# Patient Record
Sex: FEMALE | Race: WHITE | NOT HISPANIC OR LATINO | Employment: PART TIME | URBAN - METROPOLITAN AREA
[De-identification: names, ages, dates, MRNs, and addresses within clinical notes are randomized per-mention and may not be internally consistent; named-entity substitution may affect disease eponyms.]

---

## 2017-07-14 ENCOUNTER — OFFICE VISIT (OUTPATIENT)
Dept: OBSTETRICS AND GYNECOLOGY | Facility: CLINIC | Age: 32
End: 2017-07-14

## 2017-07-14 ENCOUNTER — TELEPHONE (OUTPATIENT)
Dept: OBSTETRICS AND GYNECOLOGY | Facility: CLINIC | Age: 32
End: 2017-07-14

## 2017-07-14 VITALS
SYSTOLIC BLOOD PRESSURE: 110 MMHG | WEIGHT: 207 LBS | HEIGHT: 67 IN | BODY MASS INDEX: 32.49 KG/M2 | DIASTOLIC BLOOD PRESSURE: 62 MMHG

## 2017-07-14 DIAGNOSIS — Z13.9 SCREENING: ICD-10-CM

## 2017-07-14 DIAGNOSIS — Z31.69 ENCOUNTER FOR PRECONCEPTION CONSULTATION: Primary | ICD-10-CM

## 2017-07-14 LAB
BILIRUB BLD-MCNC: NEGATIVE MG/DL
CLARITY, POC: CLEAR
COLOR UR: YELLOW
GLUCOSE UR STRIP-MCNC: NEGATIVE MG/DL
KETONES UR QL: NEGATIVE
LEUKOCYTE EST, POC: NEGATIVE
NITRITE UR-MCNC: NEGATIVE MG/ML
PH UR: 7.5 [PH] (ref 5–8)
PROT UR STRIP-MCNC: NEGATIVE MG/DL
RBC # UR STRIP: NEGATIVE /UL
SP GR UR: 1.02 (ref 1–1.03)
UROBILINOGEN UR QL: NORMAL

## 2017-07-14 PROCEDURE — 99203 OFFICE O/P NEW LOW 30 MIN: CPT | Performed by: OBSTETRICS & GYNECOLOGY

## 2017-07-14 RX ORDER — DOCONEXENT, NIACINAMIDE, .ALPHA.-TOCOPHEROL ACETATE, DL-, CHOLECALCIFEROL, .BETA.-CAROTENE, ASCORBIC ACID, THIAMINE MONONITRATE, RIBOFLAVIN, PYRIDOXINE HYDROCHLORIDE, CYANOCOBALAMIN, IRON, ZINC OXIDE, CUPRIC OXIDE, POTASSIUM IODIDE, MAGNESIUM OXIDE, FOLIC ACID, AND LEVOMEFOLATE CALCIUM 200; 15; 20; 1000; 1100; 30; 1.6; 1.8; 2.5; 12; 29; 25; 2; 150; 20; .4; .6 MG/1; MG/1; [IU]/1; [IU]/1; [IU]/1; MG/1; MG/1; MG/1; MG/1; UG/1; MG/1; MG/1; MG/1; UG/1; MG/1; MG/1; MG/1
1 CAPSULE, LIQUID FILLED ORAL DAILY
Qty: 90 CAPSULE | Refills: 3 | Status: SHIPPED | OUTPATIENT
Start: 2017-07-14 | End: 2018-07-25 | Stop reason: SDUPTHER

## 2017-07-14 RX ORDER — PRENATAL VIT NO.126/IRON/FOLIC 28MG-0.8MG
TABLET ORAL DAILY
COMMUNITY
End: 2017-07-14 | Stop reason: CLARIF

## 2017-07-14 NOTE — PROGRESS NOTES
"Subjective   Matilda Cruz is a 31 y.o. female NEW PT/would like to get established/currently trying to get pregnant - had pap earlier this year - periods are regular - discussed folic acid, birthing plan - desires TOLAC - discussed this should be no problem since first csection was due to breech presentation -    History of Present Illness    The following portions of the patient's history were reviewed and updated as appropriate: allergies, current medications, past family history, past medical history, past social history, past surgical history and problem list.    Review of Systems   Constitutional: Negative for chills, fatigue and fever.   Gastrointestinal: Negative for abdominal distention and abdominal pain.   Genitourinary: Negative for dyspareunia, dysuria, menstrual problem, pelvic pain, vaginal bleeding, vaginal discharge and vaginal pain.   All other systems reviewed and are negative.  /62  Ht 66.5\" (168.9 cm)  Wt 207 lb (93.9 kg)  LMP 06/30/2017  Breastfeeding? No  BMI 32.91 kg/m2      Objective   Physical Exam   Constitutional: She is oriented to person, place, and time. She appears well-developed and well-nourished.   HENT:   Head: Normocephalic and atraumatic.   Pulmonary/Chest: Effort normal.   Neurological: She is alert and oriented to person, place, and time.   Skin: Skin is warm and dry.   Psychiatric: She has a normal mood and affect. Her behavior is normal.   Nursing note and vitals reviewed.        Assessment/Plan   Matilda was seen today for establish care and gynecologic exam.    Diagnoses and all orders for this visit:    Encounter for preconception consultation  -     Prenat-Fe Poly-Methfol-FA-DHA (VITAFOL ULTRA) 29-0.6-0.4-200 MG capsule; Take 1 tablet by mouth Daily.    Screening  -     POC Urinalysis Dipstick    Other orders  -     Cancel: IgP, Aptima HPV    return when pregnant or for annual in November   Counseling was given to patient and  for the following topics: " patient and family education and preconceptional counseling . Total time of the encounter was 20 minutes and 15 minutes was spend counseling.

## 2017-07-14 NOTE — TELEPHONE ENCOUNTER
Matilda called me to inform me that her previous delivery was by a different MD than the two she originally told me. There was a delay in receiving records initially requested for NP glenda 7/14 as patient didn't provide both of her prior last names and her most previous gyn office is closed permentaly; however records from [facility] & Dr. Woody have been obtained and routed to Dr. Laird for review. I have requested delivery records from Dr. Grey in [place] and will route to Dr. Laird for review as soon as they are received & scanned.

## 2017-12-06 ENCOUNTER — OFFICE VISIT (OUTPATIENT)
Dept: OBSTETRICS AND GYNECOLOGY | Facility: CLINIC | Age: 32
End: 2017-12-06

## 2017-12-06 VITALS
WEIGHT: 214.6 LBS | BODY MASS INDEX: 33.68 KG/M2 | SYSTOLIC BLOOD PRESSURE: 110 MMHG | HEIGHT: 67 IN | DIASTOLIC BLOOD PRESSURE: 68 MMHG

## 2017-12-06 DIAGNOSIS — Z13.89 SCREENING FOR BLOOD OR PROTEIN IN URINE: ICD-10-CM

## 2017-12-06 DIAGNOSIS — Z01.419 ENCOUNTER FOR GYNECOLOGICAL EXAMINATION WITHOUT ABNORMAL FINDING: Primary | ICD-10-CM

## 2017-12-06 DIAGNOSIS — Z12.4 ENCOUNTER FOR SCREENING FOR CERVICAL CANCER: ICD-10-CM

## 2017-12-06 LAB
BILIRUB BLD-MCNC: NEGATIVE MG/DL
CLARITY, POC: CLEAR
COLOR UR: YELLOW
GLUCOSE UR STRIP-MCNC: NEGATIVE MG/DL
KETONES UR QL: NEGATIVE
LEUKOCYTE EST, POC: NEGATIVE
NITRITE UR-MCNC: NEGATIVE MG/ML
PH UR: 7 [PH] (ref 5–8)
PROT UR STRIP-MCNC: NEGATIVE MG/DL
RBC # UR STRIP: ABNORMAL /UL
SP GR UR: 1.02 (ref 1–1.03)
UROBILINOGEN UR QL: NORMAL

## 2017-12-06 PROCEDURE — 99395 PREV VISIT EST AGE 18-39: CPT | Performed by: OBSTETRICS & GYNECOLOGY

## 2017-12-06 NOTE — PROGRESS NOTES
"Subjective   Matilda Cruz is a 32 y.o. female pt here for annual, last pap 11/22/16 negative - has been trying to get pregnant since June but periods have just regulated since removing IUD   History of Present Illness    The following portions of the patient's history were reviewed and updated as appropriate: allergies, current medications, past family history, past medical history, past social history, past surgical history and problem list.    Review of Systems   Constitutional: Negative for chills, fatigue and fever.   Gastrointestinal: Negative for abdominal distention and abdominal pain.   Genitourinary: Negative for dyspareunia, dysuria, menstrual problem, pelvic pain, vaginal bleeding, vaginal discharge and vaginal pain.   All other systems reviewed and are negative.  /68  Ht 170.2 cm (67\")  Wt 97.3 kg (214 lb 9.6 oz)  LMP 11/19/2017 (Exact Date)  BMI 33.61 kg/m2      Objective   Physical Exam   Constitutional: She is oriented to person, place, and time. She appears well-developed and well-nourished.   Neck: Normal range of motion. Neck supple. No thyromegaly present.   Cardiovascular: Normal rate and regular rhythm.    Pulmonary/Chest: Effort normal and breath sounds normal. Right breast exhibits no mass, no nipple discharge, no skin change and no tenderness. Left breast exhibits no mass, no nipple discharge, no skin change and no tenderness.   Abdominal: Soft. Bowel sounds are normal. She exhibits no distension. There is no tenderness.   Genitourinary: Vagina normal and uterus normal. Pelvic exam was performed with patient supine. Uterus is not tender. Cervix exhibits no friability. Right adnexum displays no mass and no tenderness. Left adnexum displays no mass and no tenderness. No vaginal discharge found.   Musculoskeletal: Normal range of motion. She exhibits no edema.   Neurological: She is alert and oriented to person, place, and time.   Skin: Skin is warm and dry. No rash noted. "   Psychiatric: She has a normal mood and affect. Her behavior is normal.   Nursing note and vitals reviewed.        Assessment/Plan   Matilda was seen today for gynecologic exam.    Diagnoses and all orders for this visit:    Encounter for gynecological examination without abnormal finding    Screening for blood or protein in urine  -     POC Urinalysis Dipstick    Encounter for screening for cervical cancer   -     IgP, Aptima HPV - ThinPrep Vial, Cervix        Counseling was given to patient and   for the following topics: nutrition and weight management  .   Return June if not pregnant

## 2018-02-23 ENCOUNTER — PROCEDURE VISIT (OUTPATIENT)
Dept: OBSTETRICS AND GYNECOLOGY | Age: 33
End: 2018-02-23

## 2018-02-23 ENCOUNTER — OFFICE VISIT (OUTPATIENT)
Dept: OBSTETRICS AND GYNECOLOGY | Age: 33
End: 2018-02-23

## 2018-02-23 VITALS
BODY MASS INDEX: 34.06 KG/M2 | DIASTOLIC BLOOD PRESSURE: 60 MMHG | WEIGHT: 217 LBS | HEIGHT: 67 IN | SYSTOLIC BLOOD PRESSURE: 110 MMHG

## 2018-02-23 DIAGNOSIS — Z32.00 ENCOUNTER FOR CONFIRMATION OF PREGNANCY TEST RESULT WITH PHYSICAL EXAMINATION: Primary | ICD-10-CM

## 2018-02-23 DIAGNOSIS — O99.211 OBESITY AFFECTING PREGNANCY IN FIRST TRIMESTER: ICD-10-CM

## 2018-02-23 DIAGNOSIS — O36.80X0 ENCOUNTER TO DETERMINE FETAL VIABILITY OF PREGNANCY, SINGLE OR UNSPECIFIED FETUS: Primary | ICD-10-CM

## 2018-02-23 DIAGNOSIS — O34.219 PREVIOUS CESAREAN DELIVERY AFFECTING PREGNANCY: ICD-10-CM

## 2018-02-23 DIAGNOSIS — Z13.89 SCREENING FOR BLOOD OR PROTEIN IN URINE: ICD-10-CM

## 2018-02-23 PROBLEM — O99.210 OBESITY AFFECTING PREGNANCY: Status: ACTIVE | Noted: 2018-02-23

## 2018-02-23 LAB
BILIRUB BLD-MCNC: NEGATIVE MG/DL
CLARITY, POC: CLEAR
COLOR UR: YELLOW
GLUCOSE UR STRIP-MCNC: NEGATIVE MG/DL
KETONES UR QL: NEGATIVE
LEUKOCYTE EST, POC: NEGATIVE
NITRITE UR-MCNC: NEGATIVE MG/ML
PH UR: 6.5 [PH] (ref 5–8)
PROT UR STRIP-MCNC: NEGATIVE MG/DL
RBC # UR STRIP: ABNORMAL /UL
SP GR UR: 1.01 (ref 1–1.03)
UROBILINOGEN UR QL: NORMAL

## 2018-02-23 PROCEDURE — 99214 OFFICE O/P EST MOD 30 MIN: CPT | Performed by: OBSTETRICS & GYNECOLOGY

## 2018-02-23 PROCEDURE — 76817 TRANSVAGINAL US OBSTETRIC: CPT | Performed by: OBSTETRICS & GYNECOLOGY

## 2018-02-23 NOTE — PROGRESS NOTES
"Subjective   Matilda Cruz is a 32 y.o. female who presents for +hpt, Lmp 17 - LMP rejected - 10-2 weeks today by US -RADHA 18   History of Present Illness    The following portions of the patient's history were reviewed and updated as appropriate: allergies, current medications, past family history, past medical history, past social history, past surgical history and problem list.    Review of Systems   Constitutional: Negative for chills, fatigue and fever.   Gastrointestinal: Negative for abdominal distention and abdominal pain.   Genitourinary: Negative for dyspareunia, dysuria, menstrual problem, pelvic pain, vaginal bleeding, vaginal discharge and vaginal pain.   All other systems reviewed and are negative.  /60  Ht 170.2 cm (67\")  Wt 98.4 kg (217 lb)  LMP 2017  Breastfeeding? No  BMI 33.99 kg/m2      Objective   Physical Exam   Constitutional: She is oriented to person, place, and time. She appears well-developed and well-nourished.   Pulmonary/Chest: Effort normal.   Neurological: She is alert and oriented to person, place, and time.   Skin: Skin is warm and dry.   Psychiatric: She has a normal mood and affect. Her behavior is normal.   Nursing note and vitals reviewed.        Assessment/Plan   Matilda was seen today for follow-up.    Diagnoses and all orders for this visit:    Encounter for confirmation of pregnancy test result with physical examination  -     Varicella Zoster Antibody, IgG  -     Urine Culture - Urine, Urine, Clean Catch  -     Rubella Antibody, IgG  -     RPR  -     HIV-1 / O / 2 Ag / Antibody 4th Generation  -     Hepatitis C Antibody  -     Hepatitis B Surface Antigen  -     Hgb. Frac. Profile  -     CBC & Differential  -     Antibody Screen  -     ABO / Rh  -     Chlamydia trachomatis, Neisseria gonorrhoeae, Trichomonas vaginalis, PCR - Swab, Urine, Clean Catch    Screening for blood or protein in urine  -     POC Urinalysis Dipstick    Previous  " delivery affecting pregnancy    Obesity affecting pregnancy in first trimester

## 2018-02-26 LAB
ABO GROUP BLD: NORMAL
BACTERIA UR CULT: NO GROWTH
BACTERIA UR CULT: NORMAL
BASOPHILS # BLD AUTO: 0.02 10*3/MM3 (ref 0–0.2)
BASOPHILS NFR BLD AUTO: 0.2 % (ref 0–1.5)
BLD GP AB SCN SERPL QL: NEGATIVE
C TRACH RRNA SPEC QL NAA+PROBE: NEGATIVE
EOSINOPHIL # BLD AUTO: 0.1 10*3/MM3 (ref 0–0.7)
EOSINOPHIL NFR BLD AUTO: 1.1 % (ref 0.3–6.2)
ERYTHROCYTE [DISTWIDTH] IN BLOOD BY AUTOMATED COUNT: 13.3 % (ref 11.7–13)
HBV SURFACE AG SERPL QL IA: NEGATIVE
HCT VFR BLD AUTO: 40.3 % (ref 35.6–45.5)
HCV AB S/CO SERPL IA: <0.1 S/CO RATIO (ref 0–0.9)
HGB A MFR BLD: 97.9 % (ref 96.4–98.8)
HGB A2 MFR BLD COLUMN CHROM: 2.1 % (ref 1.8–3.2)
HGB BLD-MCNC: 13.3 G/DL (ref 11.9–15.5)
HGB C MFR BLD: 0 %
HGB F MFR BLD: 0 % (ref 0–2)
HGB FRACT BLD-IMP: NORMAL
HGB S BLD QL SOLY: NEGATIVE
HGB S MFR BLD: 0 %
HIV 1+2 AB+HIV1 P24 AG SERPL QL IA: NON REACTIVE
IMM GRANULOCYTES # BLD: 0.02 10*3/MM3 (ref 0–0.03)
IMM GRANULOCYTES NFR BLD: 0.2 % (ref 0–0.5)
LYMPHOCYTES # BLD AUTO: 1.99 10*3/MM3 (ref 0.9–4.8)
LYMPHOCYTES NFR BLD AUTO: 22.7 % (ref 19.6–45.3)
MCH RBC QN AUTO: 30.2 PG (ref 26.9–32)
MCHC RBC AUTO-ENTMCNC: 33 G/DL (ref 32.4–36.3)
MCV RBC AUTO: 91.6 FL (ref 80.5–98.2)
MONOCYTES # BLD AUTO: 0.63 10*3/MM3 (ref 0.2–1.2)
MONOCYTES NFR BLD AUTO: 7.2 % (ref 5–12)
N GONORRHOEA RRNA SPEC QL NAA+PROBE: NEGATIVE
NEUTROPHILS # BLD AUTO: 6.02 10*3/MM3 (ref 1.9–8.1)
NEUTROPHILS NFR BLD AUTO: 68.6 % (ref 42.7–76)
PLATELET # BLD AUTO: 267 10*3/MM3 (ref 140–500)
RBC # BLD AUTO: 4.4 10*6/MM3 (ref 3.9–5.2)
RH BLD: POSITIVE
RPR SER QL: NORMAL
RUBV IGG SERPL IA-ACNC: 4.18 INDEX
T VAGINALIS RRNA SPEC QL NAA+PROBE: NEGATIVE
VZV IGG SER IA-ACNC: 826 INDEX
WBC # BLD AUTO: 8.78 10*3/MM3 (ref 4.5–10.7)

## 2018-03-06 ENCOUNTER — TELEPHONE (OUTPATIENT)
Dept: OBSTETRICS AND GYNECOLOGY | Age: 33
End: 2018-03-06

## 2018-03-07 ENCOUNTER — TELEPHONE (OUTPATIENT)
Dept: OBSTETRICS AND GYNECOLOGY | Age: 33
End: 2018-03-07

## 2018-03-07 NOTE — TELEPHONE ENCOUNTER
Spoke with Pt she and told her her labs are not back yet and we would call her as soon as possible

## 2018-03-09 ENCOUNTER — TELEPHONE (OUTPATIENT)
Dept: OBSTETRICS AND GYNECOLOGY | Age: 33
End: 2018-03-09

## 2018-03-09 NOTE — TELEPHONE ENCOUNTER
I spoke with Matilda and she was informed that we need to repeat Nex Gen because of not enough DNA .She has apt this coming Friday and will have it re drawn then . She was concerned about being charged twice and I spoke with cs at [facility] and they says no charge for the redraw

## 2018-03-09 NOTE — TELEPHONE ENCOUNTER
Kiarra - please call patient and see when she can come back in for nexgen - there was not enough DNA in original draw.

## 2018-03-09 NOTE — TELEPHONE ENCOUNTER
[facility] called to let us know there was not enough DNA for the testing and pt is needing to come and do a redraw.

## 2018-03-16 ENCOUNTER — INITIAL PRENATAL (OUTPATIENT)
Dept: OBSTETRICS AND GYNECOLOGY | Age: 33
End: 2018-03-16

## 2018-03-16 VITALS — WEIGHT: 220 LBS | BODY MASS INDEX: 34.46 KG/M2 | SYSTOLIC BLOOD PRESSURE: 118 MMHG | DIASTOLIC BLOOD PRESSURE: 62 MMHG

## 2018-03-16 DIAGNOSIS — O34.219 PREVIOUS CESAREAN DELIVERY AFFECTING PREGNANCY: ICD-10-CM

## 2018-03-16 DIAGNOSIS — Z13.89 SCREENING FOR BLOOD OR PROTEIN IN URINE: ICD-10-CM

## 2018-03-16 DIAGNOSIS — O34.219 DESIRES VBAC (VAGINAL BIRTH AFTER CESAREAN) TRIAL: ICD-10-CM

## 2018-03-16 DIAGNOSIS — O99.211 OBESITY AFFECTING PREGNANCY IN FIRST TRIMESTER: ICD-10-CM

## 2018-03-16 DIAGNOSIS — Z34.81 PRENATAL CARE, SUBSEQUENT PREGNANCY IN FIRST TRIMESTER: Primary | ICD-10-CM

## 2018-03-16 LAB
BILIRUB BLD-MCNC: NEGATIVE MG/DL
CLARITY, POC: CLEAR
COLOR UR: YELLOW
GLUCOSE UR STRIP-MCNC: NEGATIVE MG/DL
KETONES UR QL: NEGATIVE
LEUKOCYTE EST, POC: NEGATIVE
NITRITE UR-MCNC: NEGATIVE MG/ML
PH UR: 6.5 [PH] (ref 5–8)
PROT UR STRIP-MCNC: NEGATIVE MG/DL
RBC # UR STRIP: ABNORMAL /UL
SP GR UR: 1.02 (ref 1–1.03)
UROBILINOGEN UR QL: NORMAL

## 2018-03-16 PROCEDURE — 99213 OFFICE O/P EST LOW 20 MIN: CPT | Performed by: OBSTETRICS & GYNECOLOGY

## 2018-03-16 NOTE — PROGRESS NOTES
Cc/ no c/o/sc    HPI: 32 y.o.  at 13w2d presents for ob intake - no complaints     Vitals:    18 1149   BP: 118/62   Weight: 99.8 kg (220 lb)         ROS:  GI:  Negative  : neg  Pulmonary: Negative     A/P  1. Intrauterine pregnancy at 13w2d   2. Pregnancy Risk:  HIGH RISK    Matilda was seen today for routine prenatal visit.    Diagnoses and all orders for this visit:    Prenatal care, subsequent pregnancy in first trimester    Screening for blood or protein in urine  -     POC Urinalysis Dipstick    Obesity affecting pregnancy in first trimester    Previous  delivery affecting pregnancy    Desires  (vaginal birth after ) trial        -----------------------    PLAN:   Return in about 4 weeks (around 2018), or ob check .  OB intake done   Prenatal labs reviewed   Will redo Nexgen   SAB warnings   4 weeks   Considering  - will continue to discuss  Obesity - discussed weight gain/nutrition       Lorie Laird MD  3/16/2018 12:50 PM

## 2018-03-23 ENCOUNTER — TELEPHONE (OUTPATIENT)
Dept: OBSTETRICS AND GYNECOLOGY | Age: 33
End: 2018-03-23

## 2018-04-11 ENCOUNTER — ROUTINE PRENATAL (OUTPATIENT)
Dept: OBSTETRICS AND GYNECOLOGY | Age: 33
End: 2018-04-11

## 2018-04-11 VITALS — SYSTOLIC BLOOD PRESSURE: 106 MMHG | BODY MASS INDEX: 34.77 KG/M2 | WEIGHT: 222 LBS | DIASTOLIC BLOOD PRESSURE: 70 MMHG

## 2018-04-11 DIAGNOSIS — O99.212 OBESITY AFFECTING PREGNANCY IN SECOND TRIMESTER: ICD-10-CM

## 2018-04-11 DIAGNOSIS — Z34.82 PRENATAL CARE, SUBSEQUENT PREGNANCY IN SECOND TRIMESTER: Primary | ICD-10-CM

## 2018-04-11 DIAGNOSIS — O34.219 PREVIOUS CESAREAN DELIVERY AFFECTING PREGNANCY: ICD-10-CM

## 2018-04-11 DIAGNOSIS — Z3A.17 17 WEEKS GESTATION OF PREGNANCY: ICD-10-CM

## 2018-04-11 DIAGNOSIS — O34.219 DESIRES VBAC (VAGINAL BIRTH AFTER CESAREAN) TRIAL: ICD-10-CM

## 2018-04-11 LAB
BILIRUB BLD-MCNC: NEGATIVE MG/DL
GLUCOSE UR STRIP-MCNC: NEGATIVE MG/DL
KETONES UR QL: NEGATIVE
LEUKOCYTE EST, POC: NEGATIVE
NITRITE UR-MCNC: NEGATIVE MG/ML
PH UR: 5.5 [PH] (ref 5–8)
PROT UR STRIP-MCNC: NEGATIVE MG/DL
RBC # UR STRIP: ABNORMAL /UL
SP GR UR: 1.02 (ref 1–1.03)
UROBILINOGEN UR QL: NORMAL

## 2018-04-11 PROCEDURE — 99213 OFFICE O/P EST LOW 20 MIN: CPT | Performed by: OBSTETRICS & GYNECOLOGY

## 2018-04-11 NOTE — PROGRESS NOTES
Chief Complaint   Patient presents with   • Routine Prenatal Visit       HPI: 32 y.o.  at 17w0d presents for prenatal care - denies complaints     Vitals:    18 1518   BP: 106/70   Weight: 101 kg (222 lb)       ROS:  GI:  Negative  : neg  Pulmonary: Negative     A/P  1. Intrauterine pregnancy at 17w0d   2. Pregnancy Risk:  HIGH RISK    Matilda was seen today for routine prenatal visit.    Diagnoses and all orders for this visit:    17 weeks gestation of pregnancy  -     POC Urinalysis Dipstick    Previous  delivery affecting pregnancy    Desires  (vaginal birth after ) trial    Obesity affecting pregnancy in second trimester        -----------------------  PLAN:   Return in about 4 weeks (around 2018), or ob check with me .  SAB warnings   Shirley US 2 weeks   Prior  for breech - desires TOLAC   Declines AFP   Lorie Laird MD  2018 3:39 PM

## 2018-04-27 ENCOUNTER — PROCEDURE VISIT (OUTPATIENT)
Dept: OBSTETRICS AND GYNECOLOGY | Age: 33
End: 2018-04-27

## 2018-04-27 ENCOUNTER — ROUTINE PRENATAL (OUTPATIENT)
Dept: OBSTETRICS AND GYNECOLOGY | Age: 33
End: 2018-04-27

## 2018-04-27 VITALS — BODY MASS INDEX: 34.93 KG/M2 | WEIGHT: 223 LBS | DIASTOLIC BLOOD PRESSURE: 72 MMHG | SYSTOLIC BLOOD PRESSURE: 112 MMHG

## 2018-04-27 DIAGNOSIS — Z34.92 SECOND TRIMESTER FETUS: Primary | ICD-10-CM

## 2018-04-27 DIAGNOSIS — IMO0002 EVALUATE ANATOMY NOT SEEN ON PRIOR SONOGRAM: ICD-10-CM

## 2018-04-27 DIAGNOSIS — O34.219 PREVIOUS CESAREAN DELIVERY AFFECTING PREGNANCY: ICD-10-CM

## 2018-04-27 DIAGNOSIS — O34.219 DESIRES VBAC (VAGINAL BIRTH AFTER CESAREAN) TRIAL: ICD-10-CM

## 2018-04-27 DIAGNOSIS — Z3A.19 19 WEEKS GESTATION OF PREGNANCY: ICD-10-CM

## 2018-04-27 DIAGNOSIS — Z34.82 PRENATAL CARE, SUBSEQUENT PREGNANCY IN SECOND TRIMESTER: Primary | ICD-10-CM

## 2018-04-27 DIAGNOSIS — O99.212 OBESITY AFFECTING PREGNANCY IN SECOND TRIMESTER: ICD-10-CM

## 2018-04-27 LAB
BILIRUB BLD-MCNC: NEGATIVE MG/DL
GLUCOSE UR STRIP-MCNC: NEGATIVE MG/DL
KETONES UR QL: ABNORMAL
LEUKOCYTE EST, POC: NEGATIVE
NITRITE UR-MCNC: NEGATIVE MG/ML
PH UR: 6.5 [PH] (ref 5–8)
PROT UR STRIP-MCNC: NEGATIVE MG/DL
RBC # UR STRIP: ABNORMAL /UL
SP GR UR: 1.01 (ref 1–1.03)
UROBILINOGEN UR QL: NORMAL

## 2018-04-27 PROCEDURE — 99213 OFFICE O/P EST LOW 20 MIN: CPT | Performed by: OBSTETRICS & GYNECOLOGY

## 2018-04-27 PROCEDURE — 76805 OB US >/= 14 WKS SNGL FETUS: CPT | Performed by: OBSTETRICS & GYNECOLOGY

## 2018-05-11 ENCOUNTER — PROCEDURE VISIT (OUTPATIENT)
Dept: OBSTETRICS AND GYNECOLOGY | Age: 33
End: 2018-05-11

## 2018-05-11 ENCOUNTER — ROUTINE PRENATAL (OUTPATIENT)
Dept: OBSTETRICS AND GYNECOLOGY | Age: 33
End: 2018-05-11

## 2018-05-11 VITALS — SYSTOLIC BLOOD PRESSURE: 120 MMHG | WEIGHT: 223 LBS | BODY MASS INDEX: 34.93 KG/M2 | DIASTOLIC BLOOD PRESSURE: 70 MMHG

## 2018-05-11 DIAGNOSIS — Z34.82 PRENATAL CARE, SUBSEQUENT PREGNANCY IN SECOND TRIMESTER: Primary | ICD-10-CM

## 2018-05-11 DIAGNOSIS — O34.219 DESIRES VBAC (VAGINAL BIRTH AFTER CESAREAN) TRIAL: ICD-10-CM

## 2018-05-11 DIAGNOSIS — IMO0002 EVALUATE ANATOMY NOT SEEN ON PRIOR SONOGRAM: Primary | ICD-10-CM

## 2018-05-11 DIAGNOSIS — O99.212 OBESITY AFFECTING PREGNANCY IN SECOND TRIMESTER: ICD-10-CM

## 2018-05-11 DIAGNOSIS — IMO0002 EVALUATE ANATOMY NOT SEEN ON PRIOR SONOGRAM: ICD-10-CM

## 2018-05-11 DIAGNOSIS — Z13.89 SCREENING FOR BLOOD OR PROTEIN IN URINE: ICD-10-CM

## 2018-05-11 DIAGNOSIS — O34.219 PREVIOUS CESAREAN DELIVERY AFFECTING PREGNANCY: ICD-10-CM

## 2018-05-11 LAB
BILIRUB BLD-MCNC: NEGATIVE MG/DL
GLUCOSE UR STRIP-MCNC: NEGATIVE MG/DL
KETONES UR QL: NEGATIVE
LEUKOCYTE EST, POC: NEGATIVE
NITRITE UR-MCNC: NEGATIVE MG/ML
PH UR: 6 [PH] (ref 5–8)
PROT UR STRIP-MCNC: NEGATIVE MG/DL
RBC # UR STRIP: NEGATIVE /UL
SP GR UR: 1.01 (ref 1–1.03)
UROBILINOGEN UR QL: NORMAL

## 2018-05-11 PROCEDURE — 99213 OFFICE O/P EST LOW 20 MIN: CPT | Performed by: OBSTETRICS & GYNECOLOGY

## 2018-05-11 PROCEDURE — 76815 OB US LIMITED FETUS(S): CPT | Performed by: OBSTETRICS & GYNECOLOGY

## 2018-05-11 NOTE — PROGRESS NOTES
Chief Complaint   Patient presents with   • Routine Prenatal Visit     cc:  No c/o.rlr       HPI: 32 y.o.  at 21w2d presents for prenatal care - denies complaints - normal completed anatomy today        Vitals:    18 0934   BP: 120/70   Weight: 101 kg (223 lb)       ROS:  GI:  Negative  : neg  Pulmonary: Negative     A/P  1. Intrauterine pregnancy at 21w2d   2. Pregnancy Risk:  HIGH RISK    Matilda was seen today for routine prenatal visit.    Diagnoses and all orders for this visit:    Screening for blood or protein in urine  -     POC Urinalysis Dipstick    Desires  (vaginal birth after ) trial    Obesity affecting pregnancy in second trimester    Evaluate anatomy not seen on prior sonogram    Previous  delivery affecting pregnancy        -----------------------  PLAN:   Return in about 3 weeks (around 2018), or ob check with me .  Normal completed anatomy today   Obesity - doing well  Prior csection - desires TOLAC   SAB warnings     Lorie Laird MD  2018 11:01 AM

## 2018-05-30 ENCOUNTER — ROUTINE PRENATAL (OUTPATIENT)
Dept: OBSTETRICS AND GYNECOLOGY | Age: 33
End: 2018-05-30

## 2018-05-30 VITALS — SYSTOLIC BLOOD PRESSURE: 102 MMHG | WEIGHT: 230 LBS | DIASTOLIC BLOOD PRESSURE: 70 MMHG | BODY MASS INDEX: 36.02 KG/M2

## 2018-05-30 DIAGNOSIS — Z34.82 PRENATAL CARE, SUBSEQUENT PREGNANCY IN SECOND TRIMESTER: Primary | ICD-10-CM

## 2018-05-30 DIAGNOSIS — O34.219 PREVIOUS CESAREAN DELIVERY AFFECTING PREGNANCY: ICD-10-CM

## 2018-05-30 DIAGNOSIS — Z3A.23 23 WEEKS GESTATION OF PREGNANCY: ICD-10-CM

## 2018-05-30 DIAGNOSIS — O34.219 DESIRES VBAC (VAGINAL BIRTH AFTER CESAREAN) TRIAL: ICD-10-CM

## 2018-05-30 DIAGNOSIS — O99.212 OBESITY AFFECTING PREGNANCY IN SECOND TRIMESTER: ICD-10-CM

## 2018-05-30 LAB
BILIRUB BLD-MCNC: NEGATIVE MG/DL
CLARITY, POC: CLEAR
COLOR UR: YELLOW
GLUCOSE UR STRIP-MCNC: NEGATIVE MG/DL
KETONES UR QL: NEGATIVE
LEUKOCYTE EST, POC: NEGATIVE
NITRITE UR-MCNC: NEGATIVE MG/ML
PH UR: 6 [PH] (ref 5–8)
PROT UR STRIP-MCNC: NEGATIVE MG/DL
RBC # UR STRIP: ABNORMAL /UL
SP GR UR: 1 (ref 1–1.03)
UROBILINOGEN UR QL: NORMAL

## 2018-05-30 PROCEDURE — 99213 OFFICE O/P EST LOW 20 MIN: CPT | Performed by: OBSTETRICS & GYNECOLOGY

## 2018-05-30 NOTE — PROGRESS NOTES
Chief Complaint   Patient presents with   • Routine Prenatal Visit     Matilda has no complaints        HPI: 32 y.o.  at 24w0d presents for prenatal care - denies loss of fluid, vag bleeding or ctx +fM     Vitals:    18 1405   BP: 102/70   Weight: 104 kg (230 lb)       ROS:  GI:  Negative  : neg  Pulmonary: Negative     A/P  1. Intrauterine pregnancy at 24w0d   2. Pregnancy Risk:  HIGH RISK    Matilda was seen today for routine prenatal visit.    Diagnoses and all orders for this visit:    23 weeks gestation of pregnancy  -     POC Urinalysis Dipstick    Obesity affecting pregnancy in second trimester    Desires  (vaginal birth after ) trial    Previous  delivery affecting pregnancy        -----------------------  PLAN:   Return in about 4 weeks (around 2018), or ob check with one-hour gtt and growth uS .  Obesity - 13 pound weight gain   Check growth next visit   Prior  - desires TOLAC   PTL warnings     Lorie Laird MD  2018 5:25 PM

## 2018-06-05 ENCOUNTER — TELEPHONE (OUTPATIENT)
Dept: OBSTETRICS AND GYNECOLOGY | Age: 33
End: 2018-06-05

## 2018-06-05 NOTE — TELEPHONE ENCOUNTER
PRIYA.    Pt came in last week stating she felt she had a cold. Pt states she thinks its just getting worse and worse. Informed pt she should seek making a appointment with her family Dr or urgent care. Pt states she has also lost about 6 pounds in a week as well. Did not mention any throwing up or diarrhea. Did mention being checked out at family Dr for flu or something else. Pt will call back if she has any questions about what they prescribe.

## 2018-06-27 ENCOUNTER — PROCEDURE VISIT (OUTPATIENT)
Dept: OBSTETRICS AND GYNECOLOGY | Age: 33
End: 2018-06-27

## 2018-06-27 ENCOUNTER — ROUTINE PRENATAL (OUTPATIENT)
Dept: OBSTETRICS AND GYNECOLOGY | Age: 33
End: 2018-06-27

## 2018-06-27 VITALS — SYSTOLIC BLOOD PRESSURE: 128 MMHG | WEIGHT: 230 LBS | DIASTOLIC BLOOD PRESSURE: 80 MMHG | BODY MASS INDEX: 36.02 KG/M2

## 2018-06-27 DIAGNOSIS — O34.219 PREVIOUS CESAREAN DELIVERY AFFECTING PREGNANCY: ICD-10-CM

## 2018-06-27 DIAGNOSIS — Z3A.28 28 WEEKS GESTATION OF PREGNANCY: ICD-10-CM

## 2018-06-27 DIAGNOSIS — Z13.0 SCREENING FOR IRON DEFICIENCY ANEMIA: ICD-10-CM

## 2018-06-27 DIAGNOSIS — O34.219 DESIRES VBAC (VAGINAL BIRTH AFTER CESAREAN) TRIAL: ICD-10-CM

## 2018-06-27 DIAGNOSIS — Z34.83 PRENATAL CARE, SUBSEQUENT PREGNANCY IN THIRD TRIMESTER: Primary | ICD-10-CM

## 2018-06-27 DIAGNOSIS — Z23 NEED FOR TDAP VACCINATION: ICD-10-CM

## 2018-06-27 DIAGNOSIS — Z36.89 ULTRASOUND SCAN TO CHECK INTERVAL GROWTH OF FETUS: Primary | ICD-10-CM

## 2018-06-27 DIAGNOSIS — O99.213 OBESITY AFFECTING PREGNANCY IN THIRD TRIMESTER: ICD-10-CM

## 2018-06-27 DIAGNOSIS — Z13.89 SCREENING FOR BLOOD OR PROTEIN IN URINE: ICD-10-CM

## 2018-06-27 DIAGNOSIS — Z13.1 SCREENING FOR DIABETES MELLITUS: ICD-10-CM

## 2018-06-27 LAB
BILIRUB BLD-MCNC: NEGATIVE MG/DL
GLUCOSE 1H P 50 G GLC PO SERPL-MCNC: 122 MG/DL (ref 65–139)
GLUCOSE UR STRIP-MCNC: ABNORMAL MG/DL
HCT VFR BLD AUTO: 36.2 % (ref 35.6–45.5)
HGB BLD-MCNC: 11.3 G/DL (ref 11.9–15.5)
KETONES UR QL: ABNORMAL
LEUKOCYTE EST, POC: NEGATIVE
NITRITE UR-MCNC: NEGATIVE MG/ML
PH UR: 6.5 [PH] (ref 5–8)
PROT UR STRIP-MCNC: NEGATIVE MG/DL
RBC # UR STRIP: ABNORMAL /UL
SP GR UR: 1.01 (ref 1–1.03)
UROBILINOGEN UR QL: NORMAL

## 2018-06-27 PROCEDURE — 90715 TDAP VACCINE 7 YRS/> IM: CPT | Performed by: OBSTETRICS & GYNECOLOGY

## 2018-06-27 PROCEDURE — 90471 IMMUNIZATION ADMIN: CPT | Performed by: OBSTETRICS & GYNECOLOGY

## 2018-06-27 PROCEDURE — 99213 OFFICE O/P EST LOW 20 MIN: CPT | Performed by: OBSTETRICS & GYNECOLOGY

## 2018-06-27 NOTE — PROGRESS NOTES
Chief Complaint   Patient presents with   • Routine Prenatal Visit      1 hr gtt , tdap , h-h        HPI: 32 y.o.  at 28w0d presents for prenatal care - denies loss of fluid, vag bleeding, ctx +FM     Vitals:    18 1511   BP: 128/80   Weight: 104 kg (230 lb)       ROS:  GI:  Negative  : neg  Pulmonary: Negative     A/P  1. Intrauterine pregnancy at 28w0d   2. Pregnancy Risk:  HIGH RISK    Matilda was seen today for routine prenatal visit.    Diagnoses and all orders for this visit:    Prenatal care, subsequent pregnancy in third trimester    Screening for blood or protein in urine  -     POC Urinalysis Dipstick    Screening for diabetes mellitus  -     Gestational Screen 1 Hr ([facility])    Screening for iron deficiency anemia  -     Hemoglobin & Hematocrit, Blood    Obesity affecting pregnancy in third trimester    Desires  (vaginal birth after ) trial    Previous  delivery affecting pregnancy    Need for Tdap vaccination    Other orders  -     Tdap Vaccine Greater Than or Equal To 8yo IM        -----------------------  PLAN:   Return in about 2 weeks (around 2018), or ob check and growth US .  Tdap today   One- hour gtt today   Growth next visit   Obesity - 13 pound weight gain     Lorie Laird MD  2018 3:42 PM

## 2018-06-28 ENCOUNTER — TELEPHONE (OUTPATIENT)
Dept: OBSTETRICS AND GYNECOLOGY | Age: 33
End: 2018-06-28

## 2018-06-28 NOTE — TELEPHONE ENCOUNTER
----- Message from Lorie Laird MD sent at 6/28/2018  7:43 AM EDT -----  Please call patient and notify of normal results of one-hour gtt

## 2018-07-09 ENCOUNTER — PROCEDURE VISIT (OUTPATIENT)
Dept: OBSTETRICS AND GYNECOLOGY | Age: 33
End: 2018-07-09

## 2018-07-09 ENCOUNTER — ROUTINE PRENATAL (OUTPATIENT)
Dept: OBSTETRICS AND GYNECOLOGY | Age: 33
End: 2018-07-09

## 2018-07-09 VITALS — DIASTOLIC BLOOD PRESSURE: 75 MMHG | WEIGHT: 230 LBS | SYSTOLIC BLOOD PRESSURE: 100 MMHG | BODY MASS INDEX: 36.02 KG/M2

## 2018-07-09 DIAGNOSIS — O34.219 PREVIOUS CESAREAN DELIVERY AFFECTING PREGNANCY: ICD-10-CM

## 2018-07-09 DIAGNOSIS — O26.843 UTERINE SIZE-DATE DISCREPANCY IN THIRD TRIMESTER: ICD-10-CM

## 2018-07-09 DIAGNOSIS — Z36.89 ULTRASOUND SCAN TO CHECK INTERVAL GROWTH OF FETUS: ICD-10-CM

## 2018-07-09 DIAGNOSIS — Z13.89 SCREENING FOR BLOOD OR PROTEIN IN URINE: ICD-10-CM

## 2018-07-09 DIAGNOSIS — O34.219 DESIRES VBAC (VAGINAL BIRTH AFTER CESAREAN) TRIAL: ICD-10-CM

## 2018-07-09 DIAGNOSIS — O99.213 OBESITY AFFECTING PREGNANCY IN THIRD TRIMESTER: Primary | ICD-10-CM

## 2018-07-09 DIAGNOSIS — Z34.83 PRENATAL CARE, SUBSEQUENT PREGNANCY IN THIRD TRIMESTER: Primary | ICD-10-CM

## 2018-07-09 DIAGNOSIS — O99.213 OBESITY AFFECTING PREGNANCY IN THIRD TRIMESTER: ICD-10-CM

## 2018-07-09 LAB
BILIRUB BLD-MCNC: NEGATIVE MG/DL
GLUCOSE UR STRIP-MCNC: NEGATIVE MG/DL
KETONES UR QL: NEGATIVE
LEUKOCYTE EST, POC: ABNORMAL
NITRITE UR-MCNC: NEGATIVE MG/ML
PH UR: 7 [PH] (ref 5–8)
PROT UR STRIP-MCNC: NEGATIVE MG/DL
RBC # UR STRIP: NEGATIVE /UL
SP GR UR: 1.01 (ref 1–1.03)
UROBILINOGEN UR QL: NORMAL

## 2018-07-09 PROCEDURE — 99213 OFFICE O/P EST LOW 20 MIN: CPT | Performed by: OBSTETRICS & GYNECOLOGY

## 2018-07-09 PROCEDURE — 76816 OB US FOLLOW-UP PER FETUS: CPT | Performed by: OBSTETRICS & GYNECOLOGY

## 2018-07-09 NOTE — PROGRESS NOTES
Chief Complaint   Patient presents with   • Routine Prenatal Visit     doing well       HPI: 32 y.o.  at 29w5d presents for prenatal care - denies loss of fluid, ctx, Vag bleeding +FM     Vitals:    18 1208   BP: 100/75   Weight: 104 kg (230 lb)       ROS:  GI:  Negative  : neg  Pulmonary: Negative     A/P  1. Intrauterine pregnancy at 29w5d   2. Pregnancy Risk:  HIGH RISK    Matilda was seen today for routine prenatal visit.    Diagnoses and all orders for this visit:    Prenatal care, subsequent pregnancy in third trimester    Previous  delivery affecting pregnancy    Desires  (vaginal birth after ) trial    Obesity affecting pregnancy in third trimester    Screening for blood or protein in urine  -     POC Urinalysis Dipstick    Uterine size-date discrepancy in third trimester        -----------------------  PLAN:   Return in about 2 weeks (around 2018).  S>D - check growth   Normal one-hour gtt   PTL warnings      Lorie Laird MD  2018 12:18 PM

## 2018-07-25 ENCOUNTER — ROUTINE PRENATAL (OUTPATIENT)
Dept: OBSTETRICS AND GYNECOLOGY | Age: 33
End: 2018-07-25

## 2018-07-25 VITALS — SYSTOLIC BLOOD PRESSURE: 130 MMHG | WEIGHT: 233 LBS | DIASTOLIC BLOOD PRESSURE: 62 MMHG | BODY MASS INDEX: 36.49 KG/M2

## 2018-07-25 DIAGNOSIS — O34.219 PREVIOUS CESAREAN DELIVERY AFFECTING PREGNANCY: ICD-10-CM

## 2018-07-25 DIAGNOSIS — Z31.69 ENCOUNTER FOR PRECONCEPTION CONSULTATION: ICD-10-CM

## 2018-07-25 DIAGNOSIS — O34.219 DESIRES VBAC (VAGINAL BIRTH AFTER CESAREAN) TRIAL: ICD-10-CM

## 2018-07-25 DIAGNOSIS — O26.843 UTERINE SIZE-DATE DISCREPANCY IN THIRD TRIMESTER: ICD-10-CM

## 2018-07-25 DIAGNOSIS — Z34.83 PRENATAL CARE, SUBSEQUENT PREGNANCY IN THIRD TRIMESTER: Primary | ICD-10-CM

## 2018-07-25 DIAGNOSIS — Z13.89 SCREENING FOR BLOOD OR PROTEIN IN URINE: ICD-10-CM

## 2018-07-25 DIAGNOSIS — O99.213 OBESITY AFFECTING PREGNANCY IN THIRD TRIMESTER: ICD-10-CM

## 2018-07-25 LAB
BILIRUB BLD-MCNC: NEGATIVE MG/DL
GLUCOSE UR STRIP-MCNC: NEGATIVE MG/DL
KETONES UR QL: NEGATIVE
LEUKOCYTE EST, POC: NEGATIVE
NITRITE UR-MCNC: NEGATIVE MG/ML
PH UR: 6.5 [PH] (ref 5–8)
PROT UR STRIP-MCNC: NEGATIVE MG/DL
RBC # UR STRIP: ABNORMAL /UL
SP GR UR: 1 (ref 1–1.03)
UROBILINOGEN UR QL: NORMAL

## 2018-07-25 PROCEDURE — 99213 OFFICE O/P EST LOW 20 MIN: CPT | Performed by: OBSTETRICS & GYNECOLOGY

## 2018-07-25 RX ORDER — DOCONEXENT, NIACINAMIDE, .ALPHA.-TOCOPHEROL ACETATE, DL-, CHOLECALCIFEROL, .BETA.-CAROTENE, ASCORBIC ACID, THIAMINE MONONITRATE, RIBOFLAVIN, PYRIDOXINE HYDROCHLORIDE, CYANOCOBALAMIN, IRON, ZINC OXIDE, CUPRIC OXIDE, POTASSIUM IODIDE, MAGNESIUM OXIDE, FOLIC ACID, AND LEVOMEFOLATE CALCIUM 200; 15; 20; 1000; 1100; 30; 1.6; 1.8; 2.5; 12; 29; 25; 2; 150; 20; .4; .6 MG/1; MG/1; [IU]/1; [IU]/1; [IU]/1; MG/1; MG/1; MG/1; MG/1; UG/1; MG/1; MG/1; MG/1; UG/1; MG/1; MG/1; MG/1
1 CAPSULE, LIQUID FILLED ORAL DAILY
Qty: 90 CAPSULE | Refills: 3 | Status: SHIPPED | OUTPATIENT
Start: 2018-07-25 | End: 2019-02-13

## 2018-07-25 NOTE — PROGRESS NOTES
Chief Complaint   Patient presents with   • Routine Prenatal Visit     feeling well , some back pain       HPI: 32 y.o.  at 32w0d presents for prenatal care - denies ctx, loss of fluid or vag bleeding +FM     Vitals:    18 1141   BP: 130/62   Weight: 106 kg (233 lb)       ROS:  GI:  Negative  : neg  Pulmonary: Negative     A/P  1. Intrauterine pregnancy at 32w0d   2. Pregnancy Risk:  HIGH RISK    Matilda was seen today for routine prenatal visit.    Diagnoses and all orders for this visit:    Prenatal care, subsequent pregnancy in third trimester    Screening for blood or protein in urine  -     POC Urinalysis Dipstick    Obesity affecting pregnancy in third trimester    Previous  delivery affecting pregnancy    Desires  (vaginal birth after ) trial    Uterine size-date discrepancy in third trimester        -----------------------  PLAN:   Return in about 2 weeks (around 2018), or ob check with me .  S>D - recheck growth at 37 weeks   Desires    Obesity - 16 lb weight gain   Back pain - seeing chiropractor  PTL warnings      Lorie Laird MD  2018 12:03 PM

## 2018-08-08 ENCOUNTER — ROUTINE PRENATAL (OUTPATIENT)
Dept: OBSTETRICS AND GYNECOLOGY | Age: 33
End: 2018-08-08

## 2018-08-08 VITALS — WEIGHT: 236 LBS | DIASTOLIC BLOOD PRESSURE: 70 MMHG | SYSTOLIC BLOOD PRESSURE: 118 MMHG | BODY MASS INDEX: 36.96 KG/M2

## 2018-08-08 DIAGNOSIS — Z3A.34 34 WEEKS GESTATION OF PREGNANCY: Primary | ICD-10-CM

## 2018-08-08 DIAGNOSIS — O34.219 PREVIOUS CESAREAN DELIVERY AFFECTING PREGNANCY: ICD-10-CM

## 2018-08-08 DIAGNOSIS — O99.213 OBESITY AFFECTING PREGNANCY IN THIRD TRIMESTER: ICD-10-CM

## 2018-08-08 PROCEDURE — 99213 OFFICE O/P EST LOW 20 MIN: CPT | Performed by: PHYSICIAN ASSISTANT

## 2018-08-08 NOTE — PROGRESS NOTES
Pt here for routine ob visit  Doing well  Good FM  No c/o  Initial FHT were around 107-110, then came up to 121.  NST was done and baseline hovered around 117-120. There was good FM and it was reactive. Pt was on for approx 30 minutes  Dr Laird reviewed with me as well and concurred  Pt reassured  Advised to c/w FKC and call for any problems

## 2018-08-16 ENCOUNTER — ROUTINE PRENATAL (OUTPATIENT)
Dept: OBSTETRICS AND GYNECOLOGY | Age: 33
End: 2018-08-16

## 2018-08-16 VITALS — WEIGHT: 234 LBS | BODY MASS INDEX: 36.65 KG/M2 | SYSTOLIC BLOOD PRESSURE: 120 MMHG | DIASTOLIC BLOOD PRESSURE: 80 MMHG

## 2018-08-16 DIAGNOSIS — O34.219 DESIRES VBAC (VAGINAL BIRTH AFTER CESAREAN) TRIAL: ICD-10-CM

## 2018-08-16 DIAGNOSIS — Z36.85 ANTENATAL SCREENING FOR STREPTOCOCCUS B: ICD-10-CM

## 2018-08-16 DIAGNOSIS — O34.219 PREVIOUS CESAREAN DELIVERY AFFECTING PREGNANCY: ICD-10-CM

## 2018-08-16 DIAGNOSIS — Z13.89 SCREENING FOR BLOOD OR PROTEIN IN URINE: ICD-10-CM

## 2018-08-16 DIAGNOSIS — Z34.83 PRENATAL CARE, SUBSEQUENT PREGNANCY IN THIRD TRIMESTER: Primary | ICD-10-CM

## 2018-08-16 DIAGNOSIS — O99.213 OBESITY AFFECTING PREGNANCY IN THIRD TRIMESTER: ICD-10-CM

## 2018-08-16 LAB
BILIRUB BLD-MCNC: NEGATIVE MG/DL
GLUCOSE UR STRIP-MCNC: NEGATIVE MG/DL
KETONES UR QL: ABNORMAL
LEUKOCYTE EST, POC: NEGATIVE
NITRITE UR-MCNC: NEGATIVE MG/ML
PH UR: 7.5 [PH] (ref 5–8)
PROT UR STRIP-MCNC: NEGATIVE MG/DL
RBC # UR STRIP: ABNORMAL /UL
SP GR UR: 1.02 (ref 1–1.03)
UROBILINOGEN UR QL: NORMAL

## 2018-08-16 PROCEDURE — 99213 OFFICE O/P EST LOW 20 MIN: CPT | Performed by: OBSTETRICS & GYNECOLOGY

## 2018-08-16 NOTE — PROGRESS NOTES
Chief Complaint   Patient presents with   • Routine Prenatal Visit     gbs today       HPI: 32 y.o.  at 35w1d presents for prenatal care - denies loss of fluid, vag bleeding, ctx +FM    Vitals:    18 0915   BP: 120/80   Weight: 106 kg (234 lb)       ROS:  GI:  Negative  : neg  Pulmonary: Negative     A/P  1. Intrauterine pregnancy at 35w1d   2. Pregnancy Risk:  HIGH RISK    Matilda was seen today for routine prenatal visit.    Diagnoses and all orders for this visit:    Prenatal care, subsequent pregnancy in third trimester    Screening for blood or protein in urine  -     POC Urinalysis Dipstick     screening for streptococcus B  -     Strep B Screen - Swab, Vagina    Obesity affecting pregnancy in third trimester    Desires  (vaginal birth after ) trial    Previous  delivery affecting pregnancy        -----------------------  PLAN:   Return please add growth US to visit next week .   GBS collected   Prior  - desires TOLAC  Discussed 1% risk of rupture again - and other risks including death of patient and fetus - Patient expressed understanding and desires to proceed    Obesity - 17 pound weight gain   PTL warnings       Lorie Laird MD  2018 9:41 AM

## 2018-08-18 PROBLEM — O99.820 GROUP B STREPTOCOCCUS CARRIER, +RV CULTURE, CURRENTLY PREGNANT: Status: ACTIVE | Noted: 2018-08-18

## 2018-08-18 LAB — GP B STREP DNA SPEC QL NAA+PROBE: POSITIVE

## 2018-08-20 ENCOUNTER — TELEPHONE (OUTPATIENT)
Dept: OBSTETRICS AND GYNECOLOGY | Age: 33
End: 2018-08-20

## 2018-08-20 NOTE — TELEPHONE ENCOUNTER
----- Message from Lorie Laird MD sent at 8/18/2018  4:56 PM EDT -----  Please call patient and notify of positive group b strep results

## 2018-08-22 ENCOUNTER — ROUTINE PRENATAL (OUTPATIENT)
Dept: OBSTETRICS AND GYNECOLOGY | Age: 33
End: 2018-08-22

## 2018-08-22 ENCOUNTER — PROCEDURE VISIT (OUTPATIENT)
Dept: OBSTETRICS AND GYNECOLOGY | Age: 33
End: 2018-08-22

## 2018-08-22 VITALS — SYSTOLIC BLOOD PRESSURE: 120 MMHG | DIASTOLIC BLOOD PRESSURE: 80 MMHG | BODY MASS INDEX: 36.96 KG/M2 | WEIGHT: 236 LBS

## 2018-08-22 DIAGNOSIS — O34.219 PREVIOUS CESAREAN DELIVERY AFFECTING PREGNANCY: ICD-10-CM

## 2018-08-22 DIAGNOSIS — O28.8 AFI (AMNIOTIC FLUID INDEX) BORDERLINE LOW: ICD-10-CM

## 2018-08-22 DIAGNOSIS — O99.213 OBESITY AFFECTING PREGNANCY IN THIRD TRIMESTER: ICD-10-CM

## 2018-08-22 DIAGNOSIS — O26.843 UTERINE SIZE-DATE DISCREPANCY IN THIRD TRIMESTER: ICD-10-CM

## 2018-08-22 DIAGNOSIS — Z13.89 SCREENING FOR BLOOD OR PROTEIN IN URINE: ICD-10-CM

## 2018-08-22 DIAGNOSIS — Z36.89 ULTRASOUND SCAN TO CHECK INTERVAL GROWTH OF FETUS: Primary | ICD-10-CM

## 2018-08-22 DIAGNOSIS — Z87.59 HISTORY OF POOR PREGNANCY OUTCOME: ICD-10-CM

## 2018-08-22 DIAGNOSIS — Z34.83 PRENATAL CARE, SUBSEQUENT PREGNANCY IN THIRD TRIMESTER: Primary | ICD-10-CM

## 2018-08-22 DIAGNOSIS — O99.820 GROUP B STREPTOCOCCUS CARRIER, +RV CULTURE, CURRENTLY PREGNANT: ICD-10-CM

## 2018-08-22 DIAGNOSIS — O34.219 DESIRES VBAC (VAGINAL BIRTH AFTER CESAREAN) TRIAL: ICD-10-CM

## 2018-08-22 LAB
BILIRUB BLD-MCNC: NEGATIVE MG/DL
GLUCOSE UR STRIP-MCNC: NEGATIVE MG/DL
KETONES UR QL: NEGATIVE
LEUKOCYTE EST, POC: ABNORMAL
NITRITE UR-MCNC: NEGATIVE MG/ML
PH UR: 7 [PH] (ref 5–8)
PROT UR STRIP-MCNC: NEGATIVE MG/DL
RBC # UR STRIP: ABNORMAL /UL
SP GR UR: 1.01 (ref 1–1.03)
UROBILINOGEN UR QL: NORMAL

## 2018-08-22 PROCEDURE — 76816 OB US FOLLOW-UP PER FETUS: CPT | Performed by: OBSTETRICS & GYNECOLOGY

## 2018-08-22 PROCEDURE — 99213 OFFICE O/P EST LOW 20 MIN: CPT | Performed by: OBSTETRICS & GYNECOLOGY

## 2018-08-22 NOTE — PROGRESS NOTES
Chief Complaint   Patient presents with   • Routine Prenatal Visit     diziness, light spotting        HPI: 32 y.o.  at 36w0d presents for prenatal care - denies ctx, loss of fluid or vag bleeding +FM     Vitals:    18 1321   BP: 120/80   Weight: 107 kg (236 lb)       ROS:  GI:  Negative  : neg  Pulmonary: Negative     A/P  1. Intrauterine pregnancy at 36w0d   2. Pregnancy Risk:  HIGH RISK    Matilda was seen today for routine prenatal visit.    Diagnoses and all orders for this visit:    Prenatal care, subsequent pregnancy in third trimester    Screening for blood or protein in urine  -     POC Urinalysis Dipstick    Uterine size-date discrepancy in third trimester    Desires  (vaginal birth after ) trial    Group B Streptococcus carrier, +RV culture, currently pregnant    Previous  delivery affecting pregnancy    Obesity affecting pregnancy in third trimester        -----------------------  PLAN:   Return in about 1 week (around 2018), or ob check and BPP.  PTL warnings   Normal growth today at 71%  GBS +  Obesity - 19 pound weight gain   Borderline low SILVANA - 8 cm - PO hydrate repeat SILVANA Friday  Long discussion again regarding 1% risk of uterine rupture with TOLAC and possible devastating consequences of rupture including death of fetus and patient - advise epidural with TOLAC but patient desires natural labor   First  was for Breech presentation - went into labor with SROM at 38 weeks- meconium   Labor warnings/FKC   1 week        Lorie Laird MD  2018 2:08 PM

## 2018-08-23 ENCOUNTER — TELEPHONE (OUTPATIENT)
Dept: OBSTETRICS AND GYNECOLOGY | Age: 33
End: 2018-08-23

## 2018-08-23 NOTE — TELEPHONE ENCOUNTER
Pt states sesylvester needs an US tomorrow, isn't sure if she needs to see you. Please advise, as of right now only US open is 8 or 830

## 2018-08-24 ENCOUNTER — PROCEDURE VISIT (OUTPATIENT)
Dept: OBSTETRICS AND GYNECOLOGY | Age: 33
End: 2018-08-24

## 2018-08-24 ENCOUNTER — TELEPHONE (OUTPATIENT)
Dept: OBSTETRICS AND GYNECOLOGY | Age: 33
End: 2018-08-24

## 2018-08-24 DIAGNOSIS — O41.03X1 OLIGOHYDRAMNIOS IN THIRD TRIMESTER, FETUS 1 OF MULTIPLE GESTATION: Primary | ICD-10-CM

## 2018-08-24 PROCEDURE — 76815 OB US LIMITED FETUS(S): CPT | Performed by: OBSTETRICS & GYNECOLOGY

## 2018-08-29 ENCOUNTER — PROCEDURE VISIT (OUTPATIENT)
Dept: OBSTETRICS AND GYNECOLOGY | Age: 33
End: 2018-08-29

## 2018-08-29 ENCOUNTER — ROUTINE PRENATAL (OUTPATIENT)
Dept: OBSTETRICS AND GYNECOLOGY | Age: 33
End: 2018-08-29

## 2018-08-29 VITALS — SYSTOLIC BLOOD PRESSURE: 124 MMHG | DIASTOLIC BLOOD PRESSURE: 80 MMHG | WEIGHT: 238 LBS | BODY MASS INDEX: 37.28 KG/M2

## 2018-08-29 DIAGNOSIS — O28.8 AFI (AMNIOTIC FLUID INDEX) BORDERLINE LOW: ICD-10-CM

## 2018-08-29 DIAGNOSIS — O41.03X0 OLIGOHYDRAMNIOS IN THIRD TRIMESTER, SINGLE OR UNSPECIFIED FETUS: Primary | ICD-10-CM

## 2018-08-29 DIAGNOSIS — O99.820 GROUP B STREPTOCOCCUS CARRIER, +RV CULTURE, CURRENTLY PREGNANT: ICD-10-CM

## 2018-08-29 DIAGNOSIS — Z13.89 SCREENING FOR BLOOD OR PROTEIN IN URINE: ICD-10-CM

## 2018-08-29 DIAGNOSIS — O34.219 PREVIOUS CESAREAN DELIVERY AFFECTING PREGNANCY: ICD-10-CM

## 2018-08-29 DIAGNOSIS — Z34.83 PRENATAL CARE, SUBSEQUENT PREGNANCY IN THIRD TRIMESTER: Primary | ICD-10-CM

## 2018-08-29 DIAGNOSIS — O99.213 OBESITY AFFECTING PREGNANCY IN THIRD TRIMESTER: ICD-10-CM

## 2018-08-29 DIAGNOSIS — O34.219 DESIRES VBAC (VAGINAL BIRTH AFTER CESAREAN) TRIAL: ICD-10-CM

## 2018-08-29 DIAGNOSIS — Z87.59 HISTORY OF POOR PREGNANCY OUTCOME: ICD-10-CM

## 2018-08-29 DIAGNOSIS — O26.843 UTERINE SIZE-DATE DISCREPANCY IN THIRD TRIMESTER: ICD-10-CM

## 2018-08-29 LAB
BILIRUB BLD-MCNC: NEGATIVE MG/DL
GLUCOSE UR STRIP-MCNC: NEGATIVE MG/DL
KETONES UR QL: NEGATIVE
LEUKOCYTE EST, POC: NEGATIVE
NITRITE UR-MCNC: NEGATIVE MG/ML
PH UR: 7.5 [PH] (ref 5–8)
PROT UR STRIP-MCNC: NEGATIVE MG/DL
RBC # UR STRIP: NEGATIVE /UL
SP GR UR: 1.02 (ref 1–1.03)
UROBILINOGEN UR QL: NORMAL

## 2018-08-29 PROCEDURE — 76819 FETAL BIOPHYS PROFIL W/O NST: CPT | Performed by: OBSTETRICS & GYNECOLOGY

## 2018-08-29 PROCEDURE — 99213 OFFICE O/P EST LOW 20 MIN: CPT | Performed by: OBSTETRICS & GYNECOLOGY

## 2018-08-29 NOTE — PROGRESS NOTES
Chief Complaint   Patient presents with   • Routine Prenatal Visit       HPI: 32 y.o.  at 37w0d presents for prenatal care - denies loss of fluid, vag bleeding, ctx +FM      Vitals:    18 1309   BP: 124/80   Weight: 108 kg (238 lb)       ROS:  GI:  Negative  : neg  Pulmonary: Negative     A/P  1. Intrauterine pregnancy at 37w0d   2. Pregnancy Risk:  HIGH RISK    Matilda was seen today for routine prenatal visit.    Diagnoses and all orders for this visit:    Prenatal care, subsequent pregnancy in third trimester    Screening for blood or protein in urine  -     POC Urinalysis Dipstick    Previous  delivery affecting pregnancy    History of poor pregnancy outcome    Group B Streptococcus carrier, +RV culture, currently pregnant    Desires  (vaginal birth after ) trial    SILVANA (amniotic fluid index) borderline low    Uterine size-date discrepancy in third trimester    Obesity affecting pregnancy in third trimester        -----------------------  PLAN:   Return in about 1 week (around 2018), or ob check and BPP.  H/o poor outcome - son with CP - BPP today   SILVANA - 9cm - BPP today   Labor warnings/FKC  GBS +         Lorie Laird MD  2018 1:27 PM

## 2018-09-05 ENCOUNTER — ROUTINE PRENATAL (OUTPATIENT)
Dept: OBSTETRICS AND GYNECOLOGY | Age: 33
End: 2018-09-05

## 2018-09-05 ENCOUNTER — PROCEDURE VISIT (OUTPATIENT)
Dept: OBSTETRICS AND GYNECOLOGY | Age: 33
End: 2018-09-05

## 2018-09-05 VITALS — SYSTOLIC BLOOD PRESSURE: 124 MMHG | BODY MASS INDEX: 37.75 KG/M2 | DIASTOLIC BLOOD PRESSURE: 78 MMHG | WEIGHT: 241 LBS

## 2018-09-05 DIAGNOSIS — Z13.89 SCREENING FOR BLOOD OR PROTEIN IN URINE: ICD-10-CM

## 2018-09-05 DIAGNOSIS — Z34.83 PRENATAL CARE, SUBSEQUENT PREGNANCY IN THIRD TRIMESTER: Primary | ICD-10-CM

## 2018-09-05 DIAGNOSIS — O99.820 GROUP B STREPTOCOCCUS CARRIER, +RV CULTURE, CURRENTLY PREGNANT: ICD-10-CM

## 2018-09-05 DIAGNOSIS — O26.843 UTERINE SIZE-DATE DISCREPANCY IN THIRD TRIMESTER: ICD-10-CM

## 2018-09-05 DIAGNOSIS — O28.8 AFI (AMNIOTIC FLUID INDEX) BORDERLINE LOW: ICD-10-CM

## 2018-09-05 DIAGNOSIS — O99.213 OBESITY AFFECTING PREGNANCY IN THIRD TRIMESTER: Primary | ICD-10-CM

## 2018-09-05 DIAGNOSIS — O99.213 OBESITY AFFECTING PREGNANCY IN THIRD TRIMESTER: ICD-10-CM

## 2018-09-05 DIAGNOSIS — O34.219 DESIRES VBAC (VAGINAL BIRTH AFTER CESAREAN) TRIAL: ICD-10-CM

## 2018-09-05 DIAGNOSIS — O34.219 PREVIOUS CESAREAN DELIVERY AFFECTING PREGNANCY: ICD-10-CM

## 2018-09-05 DIAGNOSIS — Z87.59 HISTORY OF POOR PREGNANCY OUTCOME: ICD-10-CM

## 2018-09-05 LAB
BILIRUB BLD-MCNC: NEGATIVE MG/DL
GLUCOSE UR STRIP-MCNC: NEGATIVE MG/DL
KETONES UR QL: NEGATIVE
LEUKOCYTE EST, POC: NEGATIVE
NITRITE UR-MCNC: NEGATIVE MG/ML
PH UR: 6 [PH] (ref 5–8)
PROT UR STRIP-MCNC: NEGATIVE MG/DL
RBC # UR STRIP: ABNORMAL /UL
SP GR UR: 1.01 (ref 1–1.03)
UROBILINOGEN UR QL: NORMAL

## 2018-09-05 PROCEDURE — 76819 FETAL BIOPHYS PROFIL W/O NST: CPT | Performed by: OBSTETRICS & GYNECOLOGY

## 2018-09-05 PROCEDURE — 99213 OFFICE O/P EST LOW 20 MIN: CPT | Performed by: OBSTETRICS & GYNECOLOGY

## 2018-09-05 NOTE — PROGRESS NOTES
Chief Complaint   Patient presents with   • Routine Prenatal Visit     us today , doing well       HPI: 32 y.o.  at 38w0d presents for prenatal care - denies loss of fluid, vag bleeding, ctx +FM      Vitals:    18 1535   BP: 124/78   Weight: 109 kg (241 lb)       ROS:  GI:  Negative  : neg  Pulmonary: Negative     A/P  1. Intrauterine pregnancy at 38w0d   2. Pregnancy Risk:  HIGH RISK    Matilda was seen today for routine prenatal visit.    Diagnoses and all orders for this visit:    Prenatal care, subsequent pregnancy in third trimester    Screening for blood or protein in urine  -     POC Urinalysis Dipstick    Obesity affecting pregnancy in third trimester    Previous  delivery affecting pregnancy    History of poor pregnancy outcome    Group B Streptococcus carrier, +RV culture, currently pregnant    Desires  (vaginal birth after ) trial    Uterine size-date discrepancy in third trimester        -----------------------  PLAN:   Return in about 1 week (around 2018), or ob check and bPP.  Normal growth today at 74%  Normal SILVANA today  Labor warnings/FKC        Lorie Laird MD  2018 4:19 PM

## 2018-09-12 ENCOUNTER — ROUTINE PRENATAL (OUTPATIENT)
Dept: OBSTETRICS AND GYNECOLOGY | Age: 33
End: 2018-09-12

## 2018-09-12 ENCOUNTER — PROCEDURE VISIT (OUTPATIENT)
Dept: OBSTETRICS AND GYNECOLOGY | Age: 33
End: 2018-09-12

## 2018-09-12 VITALS — BODY MASS INDEX: 37.9 KG/M2 | SYSTOLIC BLOOD PRESSURE: 128 MMHG | WEIGHT: 242 LBS | DIASTOLIC BLOOD PRESSURE: 80 MMHG

## 2018-09-12 DIAGNOSIS — O34.219 DESIRES VBAC (VAGINAL BIRTH AFTER CESAREAN) TRIAL: ICD-10-CM

## 2018-09-12 DIAGNOSIS — O28.8 AFI (AMNIOTIC FLUID INDEX) BORDERLINE LOW: ICD-10-CM

## 2018-09-12 DIAGNOSIS — Z87.59 HISTORY OF POOR PREGNANCY OUTCOME: ICD-10-CM

## 2018-09-12 DIAGNOSIS — O99.820 GROUP B STREPTOCOCCUS CARRIER, +RV CULTURE, CURRENTLY PREGNANT: ICD-10-CM

## 2018-09-12 DIAGNOSIS — O26.843 UTERINE SIZE-DATE DISCREPANCY IN THIRD TRIMESTER: Primary | ICD-10-CM

## 2018-09-12 DIAGNOSIS — Z13.89 SCREENING FOR BLOOD OR PROTEIN IN URINE: ICD-10-CM

## 2018-09-12 DIAGNOSIS — Z34.83 PRENATAL CARE, SUBSEQUENT PREGNANCY IN THIRD TRIMESTER: Primary | ICD-10-CM

## 2018-09-12 DIAGNOSIS — O99.213 OBESITY AFFECTING PREGNANCY IN THIRD TRIMESTER: ICD-10-CM

## 2018-09-12 DIAGNOSIS — O34.219 PREVIOUS CESAREAN DELIVERY AFFECTING PREGNANCY: ICD-10-CM

## 2018-09-12 LAB
BILIRUB BLD-MCNC: NEGATIVE MG/DL
GLUCOSE UR STRIP-MCNC: NEGATIVE MG/DL
KETONES UR QL: ABNORMAL
LEUKOCYTE EST, POC: NEGATIVE
NITRITE UR-MCNC: NEGATIVE MG/ML
PH UR: 5.5 [PH] (ref 5–8)
PROT UR STRIP-MCNC: NEGATIVE MG/DL
RBC # UR STRIP: ABNORMAL /UL
SP GR UR: 1.02 (ref 1–1.03)
UROBILINOGEN UR QL: NORMAL

## 2018-09-12 PROCEDURE — 76819 FETAL BIOPHYS PROFIL W/O NST: CPT | Performed by: OBSTETRICS & GYNECOLOGY

## 2018-09-12 PROCEDURE — 99213 OFFICE O/P EST LOW 20 MIN: CPT | Performed by: OBSTETRICS & GYNECOLOGY

## 2018-09-12 NOTE — PROGRESS NOTES
Chief Complaint   Patient presents with   • Routine Prenatal Visit     no c/o        HPI: 32 y.o.  at 39w0d presents for prenatal care - denies loss of fluid, vag bleeding, ctx +FM     Vitals:    18 1525   BP: 128/80   Weight: 110 kg (242 lb)       ROS:  GI:  Negative  : neg  Pulmonary: Negative     A/P  1. Intrauterine pregnancy at 39w0d   2. Pregnancy Risk:  HIGH RISK    Matilda was seen today for routine prenatal visit.    Diagnoses and all orders for this visit:    Prenatal care, subsequent pregnancy in third trimester    Screening for blood or protein in urine  -     POC Urinalysis Dipstick    Obesity affecting pregnancy in third trimester    Desires  (vaginal birth after ) trial    SILVANA (amniotic fluid index) borderline low    Previous  delivery affecting pregnancy    History of poor pregnancy outcome    Group B Streptococcus carrier, +RV culture, currently pregnant        -----------------------  PLAN:   Return in about 1 week (around 2018), or ob check and BPP.  BPP today  SILVANA 8.8 cm with > 2 x 2 cm pocket   GBS +  Labor warnings/FKC   Prior  - desires TOLAC     Lorie Laird MD  2018 4:14 PM

## 2018-09-17 ENCOUNTER — ROUTINE PRENATAL (OUTPATIENT)
Dept: OBSTETRICS AND GYNECOLOGY | Age: 33
End: 2018-09-17

## 2018-09-17 ENCOUNTER — PROCEDURE VISIT (OUTPATIENT)
Dept: OBSTETRICS AND GYNECOLOGY | Age: 33
End: 2018-09-17

## 2018-09-17 VITALS — BODY MASS INDEX: 38.06 KG/M2 | WEIGHT: 243 LBS | SYSTOLIC BLOOD PRESSURE: 125 MMHG | DIASTOLIC BLOOD PRESSURE: 80 MMHG

## 2018-09-17 DIAGNOSIS — O99.820 GROUP B STREPTOCOCCUS CARRIER, +RV CULTURE, CURRENTLY PREGNANT: ICD-10-CM

## 2018-09-17 DIAGNOSIS — Z34.83 PRENATAL CARE, SUBSEQUENT PREGNANCY IN THIRD TRIMESTER: Primary | ICD-10-CM

## 2018-09-17 DIAGNOSIS — O99.213 OBESITY AFFECTING PREGNANCY IN THIRD TRIMESTER: ICD-10-CM

## 2018-09-17 DIAGNOSIS — O41.03X0 OLIGOHYDRAMNIOS IN THIRD TRIMESTER, SINGLE OR UNSPECIFIED FETUS: Primary | ICD-10-CM

## 2018-09-17 DIAGNOSIS — O34.219 PREVIOUS CESAREAN DELIVERY AFFECTING PREGNANCY: ICD-10-CM

## 2018-09-17 DIAGNOSIS — Z87.59 HISTORY OF POOR PREGNANCY OUTCOME: ICD-10-CM

## 2018-09-17 DIAGNOSIS — Z13.89 SCREENING FOR BLOOD OR PROTEIN IN URINE: ICD-10-CM

## 2018-09-17 DIAGNOSIS — O34.219 DESIRES VBAC (VAGINAL BIRTH AFTER CESAREAN) TRIAL: ICD-10-CM

## 2018-09-17 DIAGNOSIS — O28.8 AFI (AMNIOTIC FLUID INDEX) BORDERLINE LOW: ICD-10-CM

## 2018-09-17 LAB
BILIRUB BLD-MCNC: NEGATIVE MG/DL
GLUCOSE UR STRIP-MCNC: NEGATIVE MG/DL
KETONES UR QL: NEGATIVE
LEUKOCYTE EST, POC: ABNORMAL
NITRITE UR-MCNC: NEGATIVE MG/ML
PH UR: 6 [PH] (ref 5–8)
PROT UR STRIP-MCNC: NEGATIVE MG/DL
RBC # UR STRIP: ABNORMAL /UL
SP GR UR: 1 (ref 1–1.03)
UROBILINOGEN UR QL: NORMAL

## 2018-09-17 PROCEDURE — 99213 OFFICE O/P EST LOW 20 MIN: CPT | Performed by: OBSTETRICS & GYNECOLOGY

## 2018-09-17 PROCEDURE — 76819 FETAL BIOPHYS PROFIL W/O NST: CPT | Performed by: OBSTETRICS & GYNECOLOGY

## 2018-09-17 NOTE — PROGRESS NOTES
Chief Complaint   Patient presents with   • Routine Prenatal Visit     no c/o        HPI: 32 y.o.  at 39w5d presents for prenatal care - denies loss of fluid, vag bleeding, ctx +FM       Vitals:    18 1255   BP: 125/80   Weight: 110 kg (243 lb)       ROS:  GI:  Negative  : neg  Pulmonary: Negative     A/P  1. Intrauterine pregnancy at 39w5d   2. Pregnancy Risk:  HIGH RISK    Matilda was seen today for routine prenatal visit.    Diagnoses and all orders for this visit:    Screening for blood or protein in urine  -     POC Urinalysis Dipstick        -----------------------  PLAN:   Borderline low SILVANA - > 2 x 2 pocket   IOL this Wednesday - desires TOLAC - again discussed risks to her and fetus including death   GBS +  Obesity - 26 lb weight gain   Labor warnings/C      Lorie Laird MD  2018 1:24 PM

## 2018-09-19 ENCOUNTER — HOSPITAL ENCOUNTER (OUTPATIENT)
Dept: LABOR AND DELIVERY | Facility: HOSPITAL | Age: 33
Discharge: HOME | End: 2018-09-19

## 2018-09-19 ENCOUNTER — ANESTHESIA (OUTPATIENT)
Dept: LABOR AND DELIVERY | Facility: HOSPITAL | Age: 33
End: 2018-09-19

## 2018-09-19 ENCOUNTER — ANESTHESIA EVENT (OUTPATIENT)
Dept: LABOR AND DELIVERY | Facility: HOSPITAL | Age: 33
End: 2018-09-19

## 2018-09-19 ENCOUNTER — HOSPITAL ENCOUNTER (INPATIENT)
Facility: HOSPITAL | Age: 33
LOS: 4 days | Discharge: HOME | End: 2018-09-23
Attending: OBSTETRICS & GYNECOLOGY | Admitting: OBSTETRICS & GYNECOLOGY

## 2018-09-19 DIAGNOSIS — O34.219 PREVIOUS CESAREAN DELIVERY AFFECTING PREGNANCY: Primary | ICD-10-CM

## 2018-09-19 DIAGNOSIS — O99.820 GROUP B STREPTOCOCCUS CARRIER, +RV CULTURE, CURRENTLY PREGNANT: ICD-10-CM

## 2018-09-19 DIAGNOSIS — O34.219 VBAC, DELIVERED, CURRENT HOSPITALIZATION: ICD-10-CM

## 2018-09-19 DIAGNOSIS — O99.213 OBESITY AFFECTING PREGNANCY IN THIRD TRIMESTER: ICD-10-CM

## 2018-09-19 PROBLEM — Z34.90 PREGNANCY: Status: ACTIVE | Noted: 2018-09-19

## 2018-09-19 LAB
ABO GROUP BLD: NORMAL
BLD GP AB SCN SERPL QL: NEGATIVE
DEPRECATED RDW RBC AUTO: 52 FL (ref 37–54)
ERYTHROCYTE [DISTWIDTH] IN BLOOD BY AUTOMATED COUNT: 16.5 % (ref 11.7–13)
HCT VFR BLD AUTO: 36.1 % (ref 35.6–45.5)
HGB BLD-MCNC: 11 G/DL (ref 11.9–15.5)
MCH RBC QN AUTO: 26.3 PG (ref 26.9–32)
MCHC RBC AUTO-ENTMCNC: 30.5 G/DL (ref 32.4–36.3)
MCV RBC AUTO: 86.2 FL (ref 80.5–98.2)
PLATELET # BLD AUTO: 199 10*3/MM3 (ref 140–500)
PMV BLD AUTO: 10.9 FL (ref 6–12)
RBC # BLD AUTO: 4.19 10*6/MM3 (ref 3.9–5.2)
RH BLD: POSITIVE
T&S EXPIRATION DATE: NORMAL
WBC NRBC COR # BLD: 8.76 10*3/MM3 (ref 4.5–10.7)

## 2018-09-19 PROCEDURE — 86901 BLOOD TYPING SEROLOGIC RH(D): CPT | Performed by: OBSTETRICS & GYNECOLOGY

## 2018-09-19 PROCEDURE — 25010000002 BUTORPHANOL PER 1 MG: Performed by: OBSTETRICS & GYNECOLOGY

## 2018-09-19 PROCEDURE — 3E033VJ INTRODUCTION OF OTHER HORMONE INTO PERIPHERAL VEIN, PERCUTANEOUS APPROACH: ICD-10-PCS | Performed by: OBSTETRICS & GYNECOLOGY

## 2018-09-19 PROCEDURE — 86900 BLOOD TYPING SEROLOGIC ABO: CPT | Performed by: OBSTETRICS & GYNECOLOGY

## 2018-09-19 PROCEDURE — 86850 RBC ANTIBODY SCREEN: CPT | Performed by: OBSTETRICS & GYNECOLOGY

## 2018-09-19 PROCEDURE — 85027 COMPLETE CBC AUTOMATED: CPT | Performed by: OBSTETRICS & GYNECOLOGY

## 2018-09-19 PROCEDURE — 25010000002 PENICILLIN G POTASSIUM PER 600000 UNITS: Performed by: OBSTETRICS & GYNECOLOGY

## 2018-09-19 RX ORDER — EPHEDRINE SULFATE 50 MG/ML
5 INJECTION, SOLUTION INTRAVENOUS AS NEEDED
Status: DISCONTINUED | OUTPATIENT
Start: 2018-09-19 | End: 2018-09-20 | Stop reason: HOSPADM

## 2018-09-19 RX ORDER — ERYTHROMYCIN 5 MG/G
OINTMENT OPHTHALMIC
Status: DISPENSED
Start: 2018-09-19 | End: 2018-09-20

## 2018-09-19 RX ORDER — DIPHENHYDRAMINE HYDROCHLORIDE 50 MG/ML
12.5 INJECTION INTRAMUSCULAR; INTRAVENOUS EVERY 8 HOURS PRN
Status: DISCONTINUED | OUTPATIENT
Start: 2018-09-19 | End: 2018-09-20 | Stop reason: HOSPADM

## 2018-09-19 RX ORDER — OXYTOCIN-SODIUM CHLORIDE 0.9% IV SOLN 30 UNIT/500ML 30-0.9/5 UT/ML-%
125 SOLUTION INTRAVENOUS CONTINUOUS PRN
Status: DISCONTINUED | OUTPATIENT
Start: 2018-09-20 | End: 2018-09-20 | Stop reason: HOSPADM

## 2018-09-19 RX ORDER — CARBOPROST TROMETHAMINE 250 UG/ML
250 INJECTION, SOLUTION INTRAMUSCULAR AS NEEDED
Status: DISCONTINUED | OUTPATIENT
Start: 2018-09-19 | End: 2018-09-20 | Stop reason: HOSPADM

## 2018-09-19 RX ORDER — PENICILLIN G 3000000 [IU]/50ML
3 INJECTION, SOLUTION INTRAVENOUS EVERY 4 HOURS
Status: DISCONTINUED | OUTPATIENT
Start: 2018-09-19 | End: 2018-09-20

## 2018-09-19 RX ORDER — FAMOTIDINE 10 MG/ML
20 INJECTION, SOLUTION INTRAVENOUS ONCE AS NEEDED
Status: DISCONTINUED | OUTPATIENT
Start: 2018-09-19 | End: 2018-09-20 | Stop reason: HOSPADM

## 2018-09-19 RX ORDER — TERBUTALINE SULFATE 1 MG/ML
0.25 INJECTION, SOLUTION SUBCUTANEOUS AS NEEDED
Status: DISCONTINUED | OUTPATIENT
Start: 2018-09-19 | End: 2018-09-20 | Stop reason: HOSPADM

## 2018-09-19 RX ORDER — SODIUM CHLORIDE 0.9 % (FLUSH) 0.9 %
1-10 SYRINGE (ML) INJECTION AS NEEDED
Status: DISCONTINUED | OUTPATIENT
Start: 2018-09-19 | End: 2018-09-20 | Stop reason: HOSPADM

## 2018-09-19 RX ORDER — OXYTOCIN-SODIUM CHLORIDE 0.9% IV SOLN 30 UNIT/500ML 30-0.9/5 UT/ML-%
999 SOLUTION INTRAVENOUS ONCE
Status: COMPLETED | OUTPATIENT
Start: 2018-09-19 | End: 2018-09-20

## 2018-09-19 RX ORDER — OXYTOCIN-SODIUM CHLORIDE 0.9% IV SOLN 30 UNIT/500ML 30-0.9/5 UT/ML-%
250 SOLUTION INTRAVENOUS CONTINUOUS
Status: ACTIVE | OUTPATIENT
Start: 2018-09-20 | End: 2018-09-20

## 2018-09-19 RX ORDER — PHYTONADIONE 1 MG/.5ML
INJECTION, EMULSION INTRAMUSCULAR; INTRAVENOUS; SUBCUTANEOUS
Status: DISPENSED
Start: 2018-09-19 | End: 2018-09-20

## 2018-09-19 RX ORDER — MISOPROSTOL 200 UG/1
800 TABLET ORAL AS NEEDED
Status: DISCONTINUED | OUTPATIENT
Start: 2018-09-19 | End: 2018-09-20 | Stop reason: HOSPADM

## 2018-09-19 RX ORDER — SODIUM CHLORIDE, SODIUM LACTATE, POTASSIUM CHLORIDE, CALCIUM CHLORIDE 600; 310; 30; 20 MG/100ML; MG/100ML; MG/100ML; MG/100ML
125 INJECTION, SOLUTION INTRAVENOUS CONTINUOUS
Status: DISCONTINUED | OUTPATIENT
Start: 2018-09-19 | End: 2018-09-20

## 2018-09-19 RX ORDER — LIDOCAINE HYDROCHLORIDE 10 MG/ML
5 INJECTION, SOLUTION EPIDURAL; INFILTRATION; INTRACAUDAL; PERINEURAL AS NEEDED
Status: DISCONTINUED | OUTPATIENT
Start: 2018-09-19 | End: 2018-09-20 | Stop reason: HOSPADM

## 2018-09-19 RX ORDER — ONDANSETRON 2 MG/ML
4 INJECTION INTRAMUSCULAR; INTRAVENOUS ONCE AS NEEDED
Status: DISCONTINUED | OUTPATIENT
Start: 2018-09-19 | End: 2018-09-20 | Stop reason: HOSPADM

## 2018-09-19 RX ORDER — OXYTOCIN-SODIUM CHLORIDE 0.9% IV SOLN 30 UNIT/500ML 30-0.9/5 UT/ML-%
2-20 SOLUTION INTRAVENOUS
Status: DISCONTINUED | OUTPATIENT
Start: 2018-09-19 | End: 2018-09-20

## 2018-09-19 RX ORDER — BUTORPHANOL TARTRATE 1 MG/ML
1 INJECTION, SOLUTION INTRAMUSCULAR; INTRAVENOUS EVERY 4 HOURS PRN
Status: DISCONTINUED | OUTPATIENT
Start: 2018-09-19 | End: 2018-09-20

## 2018-09-19 RX ORDER — FAMOTIDINE 10 MG/ML
20 INJECTION, SOLUTION INTRAVENOUS EVERY 12 HOURS SCHEDULED
Status: DISCONTINUED | OUTPATIENT
Start: 2018-09-19 | End: 2018-09-20 | Stop reason: HOSPADM

## 2018-09-19 RX ORDER — METHYLERGONOVINE MALEATE 0.2 MG/ML
200 INJECTION INTRAVENOUS ONCE AS NEEDED
Status: COMPLETED | OUTPATIENT
Start: 2018-09-19 | End: 2018-09-20

## 2018-09-19 RX ORDER — FAMOTIDINE 20 MG/1
20 TABLET, FILM COATED ORAL EVERY 12 HOURS SCHEDULED
Status: DISCONTINUED | OUTPATIENT
Start: 2018-09-19 | End: 2018-09-20 | Stop reason: HOSPADM

## 2018-09-19 RX ORDER — CEFAZOLIN SODIUM 2 G/100ML
2 INJECTION, SOLUTION INTRAVENOUS ONCE
Status: CANCELLED | OUTPATIENT
Start: 2018-09-19 | End: 2018-09-19

## 2018-09-19 RX ADMIN — SODIUM CHLORIDE, POTASSIUM CHLORIDE, SODIUM LACTATE AND CALCIUM CHLORIDE 125 ML/HR: 600; 310; 30; 20 INJECTION, SOLUTION INTRAVENOUS at 22:08

## 2018-09-19 RX ADMIN — OXYTOCIN 2 MILLI-UNITS/MIN: 10 INJECTION, SOLUTION INTRAMUSCULAR; INTRAVENOUS at 07:40

## 2018-09-19 RX ADMIN — PENICILLIN G 3 MILLION UNITS: 3000000 INJECTION, SOLUTION INTRAVENOUS at 15:19

## 2018-09-19 RX ADMIN — PENICILLIN G 3 MILLION UNITS: 3000000 INJECTION, SOLUTION INTRAVENOUS at 19:12

## 2018-09-19 RX ADMIN — SODIUM CHLORIDE 5 MILLION UNITS: 900 INJECTION INTRAVENOUS at 06:48

## 2018-09-19 RX ADMIN — BUTORPHANOL TARTRATE 1 MG: 1 INJECTION, SOLUTION INTRAMUSCULAR; INTRAVENOUS at 18:13

## 2018-09-19 RX ADMIN — SODIUM CHLORIDE, POTASSIUM CHLORIDE, SODIUM LACTATE AND CALCIUM CHLORIDE 125 ML/HR: 600; 310; 30; 20 INJECTION, SOLUTION INTRAVENOUS at 14:20

## 2018-09-19 RX ADMIN — SODIUM CHLORIDE, POTASSIUM CHLORIDE, SODIUM LACTATE AND CALCIUM CHLORIDE 125 ML/HR: 600; 310; 30; 20 INJECTION, SOLUTION INTRAVENOUS at 06:48

## 2018-09-19 RX ADMIN — PENICILLIN G 3 MILLION UNITS: 3000000 INJECTION, SOLUTION INTRAVENOUS at 23:38

## 2018-09-19 RX ADMIN — PENICILLIN G 3 MILLION UNITS: 3000000 INJECTION, SOLUTION INTRAVENOUS at 10:55

## 2018-09-19 NOTE — ANESTHESIA PREPROCEDURE EVALUATION
Anesthesia Evaluation     Patient summary reviewed and Nursing notes reviewed   NPO Solid Status: > 8 hours  NPO Liquid Status: > 8 hours           Airway   Mallampati: II  TM distance: >3 FB  Neck ROM: full  No difficulty expected  Dental      Pulmonary - normal exam   Cardiovascular - normal exam        Neuro/Psych  GI/Hepatic/Renal/Endo    (+) obesity,       Musculoskeletal     Abdominal    Substance History      OB/GYN    (+) Pregnant,         Other        ROS/Med Hx Other: TOLAC                  Anesthesia Plan    ASA 2     epidural     Anesthetic plan, all risks, benefits, and alternatives have been provided, discussed and informed consent has been obtained with: patient.

## 2018-09-19 NOTE — H&P
[facility]  Obstetric History and Physical    Chief Complaint   Patient presents with   • Scheduled Induction     term//low fluid       Subjective     Patient is a 32 y.o. female  currently at 40w0d, who presents for induction of labor due to borderline low SILVANA at term. Denies ctx, loss of fluid, vag bleeding. +FM.    Her prenatal care is complicated by  GBS +, prior   desires  and abnormal amniotic fluid  oligohydramnios.  Her previous obstetric/gynecological history is noted for is non-contributory.    The following portions of the patients history were reviewed and updated as appropriate: current medications, allergies, past medical history, past surgical history, past family history, past social history and problem list .       Prenatal Information:  Prenatal Results     Initial Prenatal Labs     Test Value Reference Range Date Time    Hemoglobin 13.3 g/dL 11.9 - 15.5 g/dL 18 0950    Hematocrit 40.3 % 35.6 - 45.5 % 18 0950    Platelets 199 10*3/mm3 140 - 500 10*3/mm3 18 0647    Rubella IgG 4.18 index Immune >0.99 index 18 0950    Hepatitis B SAg Negative  Negative 18 0950    Hepatitis C Ab <0.1 s/co ratio 0.0 - 0.9 s/co ratio 18 0950    RPR Comment  Non-Reactive 18 0950    ABO A   18 0950    Rh Positive   18 0950    Antibody Screen Negative  Negative 18 0950    HIV Non Reactive  Non Reactive 18 0950    Urine Culture Final report   18 0950    Gonorrhea Negative  Negative 18 0945    Chlamydia Negative  Negative 18 0945    TSH              2nd and 3rd Trimester     Test Value Reference Range Date Time    Hemoglobin (repeated) 11.0 g/dL (L) 11.9 - 15.5 g/dL 18 0647    Hematocrit (repeated) 36.1 % 35.6 - 45.5 % 18 0647     mg/dL 65 - 139 mg/dL 18 1609    Antibody Screen (repeated)        GTT Fasting        GTT 1 Hr        GTT 2 Hr        GTT 3 Hr        Group B Strep Positive  (A)  Negative 08/16/18 1100          Drug Screening     Test Value Reference Range Date Time    Amphetamine Screen        Barbiturate Screen        Benzodiazepine Screen        Methadone Screen        Phencyclidine Screen        Opiates Screen        THC Screen        Cocaine Screen        Propoxyphene Screen        Buprenorphine Screen        Methamphetamine Screen        Oxycodone Screen        Tryicyclic Antidepressants Screen              Other (Risk screening)     Test Value Reference Range Date Time    Varicella IgG 826 index Immune >165 index 02/23/18 0950    Parvovirus IgG        CMV IgG        Cystic Fibrosis        Hemoglobin electrophoresis NORMAL   02/23/18     NIPT        MSAFP-4        AFP (for NTD only)                  External Prenatal Results     Pregnancy Outside Results - Transcribed From Office Records - See Scanned Records For Details     Test Value Date Time    Hgb 11.0 g/dL (L) 09/19/18 0647    Hct 36.1 % 09/19/18 0647    ABO A  02/23/18 0950    Rh Positive  02/23/18 0950    Antibody Screen Negative  02/23/18 0950    Glucose Fasting GTT       Glucose Tolerance Test 1 hour       Glucose Tolerance Test 3 hour       Gonorrhea (discrete) Negative  02/23/18 0945    Chlamydia (discrete) Negative  02/23/18 0945    RPR Comment  02/23/18 0950    VDRL       Syphilis Antibody       Rubella 4.18 index 02/23/18 0950    HBsAg Negative  02/23/18 0950    Herpes Simplex Virus PCR       Herpes Simplex VIrus Culture       HIV Non Reactive  02/23/18 0950    Hep C RNA Quant PCR       Hep C Antibody <0.1 s/co ratio 02/23/18 0950    AFP       Group B Strep Positive  (A) 08/16/18 1100    GBS Susceptibility to Clindamycin       GBS Susceptibility to Erythromycin       Fetal Fibronectin       Genetic Testing, Maternal Blood             Drug Screening     Test Value Date Time    Urine Drug Screen       Amphetamine Screen       Barbiturate Screen       Benzodiazepine Screen       Methadone Screen       Phencyclidine Screen        Opiates Screen       THC Screen       Cocaine Screen       Propoxyphene Screen       Buprenorphine Screen       Methamphetamine Screen       Oxycodone Screen       Tricyclic Antidepressants Screen                    Past OB History:     Obstetric History       T1      L1     SAB0   TAB0   Ectopic0   Molar0   Multiple0   Live Births1       # Outcome Date GA Lbr Jamel/2nd Weight Sex Delivery Anes PTL Lv   2 Current            1 Term 12 38w2d  3572 g (7 lb 14 oz)  CS-LTranv   JOANN      Name: Nir      Complications: Cerebral palsy (CMS/HCC),Breech presentation      Obstetric Comments   2.23.18 - LMP rejected - IUP at 10-2 weeks - RADHA 18 - [facility]     4.27.18 - Normal, but INCOMPLETE anatomy - [facility]    5.11.18 - 21-2 weeks - normal outflow tracts, completed anatomy - [facility]    7.9.18 - 29-5 weeks - EFW 80%, AC 83% - [facility]    8.22.18 - 36-0 weeks - EFW 72%, AC 71%, SILVANA 8 cm - [facility]    8.24.18 - 36-2 weeks - SILVANA - 9.3 cm -[facility]    9.5.18 - 38-0 weeks - EFW 74%, AC 69%, SILVANA - 11.9 cm - [facility]    9.17.18 - 39-5 weeks - BPP 8/8 SILVANA 7.5 cm, > 2 x 2 cm pocket - [facility]        Past Medical History: Past Medical History:   Diagnosis Date   • Abnormal Pap smear of cervix    • Cervical dysplasia    • HPV (human papilloma virus) infection       Past Surgical History Past Surgical History:   Procedure Laterality Date   •  SECTION  2012    breech      Family History: Family History   Problem Relation Age of Onset   • Cerebral palsy Son    • Hypertension Paternal Grandmother    • Breast cancer Neg Hx    • Uterine cancer Neg Hx    • Ovarian cancer Neg Hx    • Colon cancer Neg Hx    • Deep vein thrombosis Neg Hx    • Pulmonary embolism Neg Hx    • Osteoporosis Neg Hx       Social History:  reports that she has never smoked. She has never used smokeless tobacco.   reports that she does not drink alcohol.   reports that she does not use drugs.        Review of Systems      Objective     Vital Signs Range for the last 24  hours  Temperature: Temp:  [98.4 °F (36.9 °C)] 98.4 °F (36.9 °C)   Temp Source: Temp src: Oral   BP: BP: (124)/(79) 124/79   Pulse: Heart Rate:  [83] 83   Respirations: Resp:  [18] 18   SPO2: SpO2:  [99 %] 99 %   O2 Amount (l/min):     O2 Devices     Weight: Weight:  [111 kg (245 lb 6.4 oz)] 111 kg (245 lb 6.4 oz)     Physical Examination: General appearance - alert, well appearing, and in no distress  Abdomen - gravid, soft, nontender, nondistended, no masses or organomegaly  Musculoskeletal - no joint tenderness, deformity or swelling  Extremities - peripheral pulses normal, no pedal edema, no clubbing or cyanosis  Skin - normal coloration and turgor, no rashes, no suspicious skin lesions noted    Presentation: vertex   Cervix: Exam by:     Dilation:     Effacement:     Station:       Fetal Heart Rate Assessment   Method: Fetal HR Assessment Method: external   Beats/min: Fetal HR (beats/min): 125   Baseline: Fetal Heart Baseline Rate: normal range   Variability: Fetal HR Variability: moderate (amplitude range 6 to 25 bpm)   Accels: Fetal HR Accelerations: greater than/equal to 15 bpm, lasting at least 15 seconds   Decels: Fetal HR Decelerations: absent   Tracing Category:       Uterine Assessment   Method: Method: external tocotransducer   Frequency (min):     Ctx Count in 10 min:     Duration:     Intensity: Contraction Intensity: no contractions   Intensity by IUPC:     Resting Tone:     Resting Tone by IUPC:     [place] Units:       Laboratory Results:     Results from last 7 days  Lab Units 18  0647   WBC 10*3/mm3 8.76   HEMOGLOBIN g/dL 11.0*   HEMATOCRIT % 36.1   PLATELETS 10*3/mm3 199     Assessment/Plan     Active Problems:    Previous  delivery affecting pregnancy    Obesity affecting pregnancy    Desires  (vaginal birth after ) trial    Group B Streptococcus carrier, +RV culture, currently pregnant    SILVANA (amniotic fluid index) borderline low    History of poor pregnancy  outcome    Pregnancy      Assessment & Plan    Assessment:  1.  Intrauterine pregnancy at 40w0d gestation with reactive, reassuring fetal status.    2.  labor  without ROM and induction of labor  for borderline low SILVANA   with favorable cervix  3.  Obstetrical history significant for is non-contributory.  4.  GBS status:   Strep Gp B JADEN   Date Value Ref Range Status   2018 Positive (A) Negative Final     Comment:     Centers for Disease Control and Prevention (CDC) and American Congress  of Obstetricians and Gynecologists (ACOG) guidelines for prevention of   group B streptococcal (GBS) disease specify co-collection of  a vaginal and rectal swab specimen to maximize sensitivity of GBS  detection. Per the CDC and ACOG, swabbing both the lower vagina and  rectum substantially increases the yield of detection compared with  sampling the vagina alone.  Penicillin G, ampicillin, or cefazolin are indicated for intrapartum  prophylaxis of  GBS colonization. Reflex susceptibility  testing should be performed prior to use of clindamycin only on GBS  isolates from penicillin-allergic women who are considered a high risk  for anaphylaxis. Treatment with vancomycin without additional testing  is warranted if resistance to clindamycin is noted.         Plan:  1. fetal and uterine monitoring  continuously, labor augmentation  Pitocin and antibiotic for GBS  2. Plan of care has been reviewed with patient and  and nurse.  3.  Risks, benefits of treatment plan have been discussed.  4.  All questions have been answered.  5.  Risks of TOLAC have been discussed multiple times with patient and  including 1 % risk of uterine rupture resulting in death of patient or fetus. Both expressed understanding and desire to proceed with TOLAC.      Lorie Laird MD  2018  7:28 AM

## 2018-09-19 NOTE — PLAN OF CARE
Problem: Patient Care Overview  Goal: Plan of Care Review  Outcome: Ongoing (interventions implemented as appropriate)   09/19/18 1753   Coping/Psychosocial   Plan of Care Reviewed With patient   Plan of Care Review   Progress improving     Goal: Individualization and Mutuality  Outcome: Ongoing (interventions implemented as appropriate)   09/19/18 1753   Individualization   Patient Specific Preferences NCB, , intermittent montioring   Patient Specific Goals (Include Timeframe) NCB if able   Patient Specific Interventions RADHA, Breastfeeding, pain control techniques   Mutuality/Individual Preferences   What Anxieties, Fears, Concerns, or Questions Do You Have About Your Care? n/a   What Information Would Help Us Give You More Personalized Care? n/a   How Would You and/or Your Support Person Like to Participate in Your Care? n/a   Mutuality/Individual Preferences   How to Address Anxieties/Fears n/a     Goal: Discharge Needs Assessment  Outcome: Ongoing (interventions implemented as appropriate)   09/19/18 1753   Discharge Needs Assessment   Readmission Within the Last 30 Days no previous admission in last 30 days   Concerns to be Addressed no discharge needs identified   Patient/Family Anticipates Transition to home with family   Patient/Family Anticipated Services at Transition none   Transportation Concerns car, none   Transportation Anticipated car, drives self   Anticipated Changes Related to Illness none   Equipment Needed After Discharge none   Offered/Gave Vendor List no   Disability   Equipment Currently Used at Home none     Goal: Interprofessional Rounds/Family Conf  Outcome: Ongoing (interventions implemented as appropriate)   09/19/18 1753   Interdisciplinary Rounds/Family Conf   Participants family;nursing;patient       Problem: Labor (Cervical Ripen, Induct, Augment) (Adult,Obstetrics,Pediatric)  Goal: Signs and Symptoms of Listed Potential Problems Will be Absent, Minimized or Managed  (Labor)  Outcome: Ongoing (interventions implemented as appropriate)   09/19/18 0126   Goal/Outcome Evaluation   Problems Assessed (Labor) all   Problems Present (Labor) none

## 2018-09-20 PROBLEM — O34.219 VBAC, DELIVERED, CURRENT HOSPITALIZATION: Status: ACTIVE | Noted: 2018-09-20

## 2018-09-20 PROCEDURE — 0KQM0ZZ REPAIR PERINEUM MUSCLE, OPEN APPROACH: ICD-10-PCS | Performed by: OBSTETRICS & GYNECOLOGY

## 2018-09-20 PROCEDURE — 25010000002 METHYLERGONOVINE MALEATE PER 0.2 MG: Performed by: OBSTETRICS & GYNECOLOGY

## 2018-09-20 PROCEDURE — 25010000002 BUTORPHANOL PER 1 MG: Performed by: OBSTETRICS & GYNECOLOGY

## 2018-09-20 RX ORDER — ONDANSETRON 2 MG/ML
4 INJECTION INTRAMUSCULAR; INTRAVENOUS EVERY 6 HOURS PRN
Status: DISCONTINUED | OUTPATIENT
Start: 2018-09-20 | End: 2018-09-23 | Stop reason: HOSPADM

## 2018-09-20 RX ORDER — PRENATAL VIT NO.126/IRON/FOLIC 28MG-0.8MG
1 TABLET ORAL DAILY
Status: DISCONTINUED | OUTPATIENT
Start: 2018-09-20 | End: 2018-09-23 | Stop reason: HOSPADM

## 2018-09-20 RX ORDER — LANOLIN
CREAM (ML) TOPICAL
Status: DISCONTINUED | OUTPATIENT
Start: 2018-09-20 | End: 2018-09-23 | Stop reason: HOSPADM

## 2018-09-20 RX ORDER — DOCUSATE SODIUM 100 MG/1
100 CAPSULE, LIQUID FILLED ORAL 2 TIMES DAILY
Status: DISCONTINUED | OUTPATIENT
Start: 2018-09-20 | End: 2018-09-23 | Stop reason: HOSPADM

## 2018-09-20 RX ORDER — SODIUM CHLORIDE 0.9 % (FLUSH) 0.9 %
1-10 SYRINGE (ML) INJECTION AS NEEDED
Status: DISCONTINUED | OUTPATIENT
Start: 2018-09-20 | End: 2018-09-23 | Stop reason: HOSPADM

## 2018-09-20 RX ORDER — BISACODYL 10 MG
10 SUPPOSITORY, RECTAL RECTAL DAILY PRN
Status: DISCONTINUED | OUTPATIENT
Start: 2018-09-21 | End: 2018-09-23 | Stop reason: HOSPADM

## 2018-09-20 RX ORDER — OXYCODONE HYDROCHLORIDE AND ACETAMINOPHEN 5; 325 MG/1; MG/1
1 TABLET ORAL EVERY 4 HOURS PRN
Status: DISCONTINUED | OUTPATIENT
Start: 2018-09-20 | End: 2018-09-23 | Stop reason: HOSPADM

## 2018-09-20 RX ORDER — ZOLPIDEM TARTRATE 5 MG/1
5 TABLET ORAL NIGHTLY PRN
Status: DISCONTINUED | OUTPATIENT
Start: 2018-09-20 | End: 2018-09-23 | Stop reason: HOSPADM

## 2018-09-20 RX ORDER — IBUPROFEN 800 MG/1
800 TABLET ORAL EVERY 8 HOURS PRN
Status: DISCONTINUED | OUTPATIENT
Start: 2018-09-20 | End: 2018-09-23 | Stop reason: HOSPADM

## 2018-09-20 RX ORDER — LIDOCAINE HYDROCHLORIDE 10 MG/ML
30 INJECTION, SOLUTION INFILTRATION; PERINEURAL ONCE
Status: COMPLETED | OUTPATIENT
Start: 2018-09-20 | End: 2018-09-20

## 2018-09-20 RX ORDER — ONDANSETRON 4 MG/1
4 TABLET, FILM COATED ORAL EVERY 8 HOURS PRN
Status: DISCONTINUED | OUTPATIENT
Start: 2018-09-20 | End: 2018-09-23 | Stop reason: HOSPADM

## 2018-09-20 RX ADMIN — OXYTOCIN 999 ML/HR: 10 INJECTION, SOLUTION INTRAMUSCULAR; INTRAVENOUS at 01:36

## 2018-09-20 RX ADMIN — LIDOCAINE HYDROCHLORIDE 30 ML: 10 INJECTION, SOLUTION EPIDURAL; INFILTRATION; INTRACAUDAL; PERINEURAL at 02:02

## 2018-09-20 RX ADMIN — OXYTOCIN 250 ML/HR: 10 INJECTION INTRAVENOUS at 02:10

## 2018-09-20 RX ADMIN — DOCUSATE SODIUM 100 MG: 100 CAPSULE, LIQUID FILLED ORAL at 21:30

## 2018-09-20 RX ADMIN — OXYCODONE HYDROCHLORIDE AND ACETAMINOPHEN 1 TABLET: 5; 325 TABLET ORAL at 21:31

## 2018-09-20 RX ADMIN — Medication 1 TABLET: at 08:28

## 2018-09-20 RX ADMIN — OXYCODONE HYDROCHLORIDE AND ACETAMINOPHEN 1 TABLET: 5; 325 TABLET ORAL at 08:29

## 2018-09-20 RX ADMIN — METHYLERGONOVINE MALEATE 200 MCG: 0.2 INJECTION, SOLUTION INTRAMUSCULAR; INTRAVENOUS at 01:41

## 2018-09-20 RX ADMIN — BUTORPHANOL TARTRATE 2 MG: 1 INJECTION, SOLUTION INTRAMUSCULAR; INTRAVENOUS at 01:45

## 2018-09-20 RX ADMIN — IBUPROFEN 800 MG: 800 TABLET ORAL at 03:48

## 2018-09-20 RX ADMIN — Medication: at 08:29

## 2018-09-20 RX ADMIN — DOCUSATE SODIUM 100 MG: 100 CAPSULE, LIQUID FILLED ORAL at 08:29

## 2018-09-20 RX ADMIN — IBUPROFEN 800 MG: 800 TABLET ORAL at 12:33

## 2018-09-20 RX ADMIN — IBUPROFEN 800 MG: 800 TABLET ORAL at 21:31

## 2018-09-20 NOTE — LACTATION NOTE
P2. Patient set up with [facility] . Baby Can was moved to [facility] for cardiology consult.       Lactation Consult Note    Evaluation Completed: 2018 11:56 AM  Patient Name: Matilda Cruz  :  1985  MRN:  [id number]     REFERRAL  INFORMATION:                          Date of Referral: 18   Person Making Referral: nurse  Maternal Reason for Referral: other (see comments) (pumping for NICU infant)  Infant Reason for Referral: NICU admission    DELIVERY HISTORY:          Skin to skin initiation date/time: 2018  1:26 AM   Skin to skin end date/time:              MATERNAL ASSESSMENT:  Breast Size Issue: none (18 1153 : Marcelina Lemus, RN)  Breast Shape: pendulous, round (18 1153 : Marcelina Lemus, RN)                         INFANT ASSESSMENT:  Information for the patient's :  Ernst Cruz [[id number]]   No past medical history on file.                                                                                                                                MATERNAL INFANT FEEDING:  Maternal Preparation: breast care (18 1153 : Marcelina Lemus RN)  Maternal Emotional State: assist needed (18 1153 : Marcelina Lemus, RN)                                                                 EQUIPMENT TYPE:  Breast Pump Type: double electric, hospital grade (18 1153 : Marcelina Lemus, RN)  Breast Pump Flange Type: hard (18 1153 : Marcelina Lemus, RN)  Breast Pump Flange Size: 24 mm (18 1153 : Marcelina Lemus, RN)                        BREAST PUMPING:  Breast Pumping Interventions: early pumping promoted, frequent pumping encouraged (18 1153 : Marcelina Lemus, RN)  Breast Pumping: bilateral breasts pumped until soft, double electric breast pump utilized (18 1153 : Marcelina Lemus, RN)    LACTATION REFERRALS:

## 2018-09-20 NOTE — L&D DELIVERY NOTE
[facility]  Vaginal Delivery Note  Matilda Cruz  1985    Delivery     Delivery: , Spontaneous     YOB: 2017    Time of Birth: 1:26 AM      Anesthesia: None     Delivering clinician: Lorie Laird    Forceps?   No   Vacuum? No    Shoulder dystocia present: No        Delivery narrative: Patient presented for scheduled induction of labor due to borderline low SILVANA at 40 weeks. Pitocin was started and spontaneous rupture of membranes occurred.  Patient labored without an epidural.   Once complete she pushed to deliver a LVMI in direct occiput anterior position that restituted to maternal right. Anterior and posterior shoulder delivered without difficulty. Infant was bulb suctioned and after delayed cord clamping, cord was cut by the father and infant placed on the mother's chest. Placenta delivered SI/3VC. Bilateral deep sulcal and second degree midline lacerations were repaired after Lidocaine was used for anesthesia. Vaginal packing was placed.            Infant    Findings: male  infant     Infant observations: Weight: 3853 g (8 lb 7.9 oz)   Length: 21  in  Observations/Comments:  scale 4      Apgars: 8   @ 1 minute /    9   @ 5 minutes   Infant Name: Can     Placenta, Cord, and Fluid    Placenta delivered  Spontaneous  at   2018  1:36 AM     Cord: 3 vessels  present.   Nuchal Cord?  no   Cord blood obtained: Yes    Cord gases obtained:  No              Repair    Episiotomy: None    Lacerations: Yes  Laceration Information  Laceration Repaired?   Perineal: 2nd  Yes    Periurethral:         Labial:         Sulcus: bilateral  Yes    Vaginal: No       Cervical: No          Suture used for repair: 3-0 Vicryl   Estimated Blood Loss: Est. Blood Loss (mL): 400 mL (Filed from Delivery Summary) (18 0126)           Complications  none    Disposition  Mother to Remain in LD  in stable condition currently.  Baby to remains with mom  in stable condition currently.      Lorie  Carolina Laird MD  09/21/18  8:43 AM

## 2018-09-20 NOTE — PLAN OF CARE
Problem: Patient Care Overview  Goal: Plan of Care Review  Outcome: Ongoing (interventions implemented as appropriate)   18   Coping/Psychosocial   Plan of Care Reviewed With patient;spouse   Plan of Care Review   Progress improving   OTHER   Outcome Summary Uncomplicated , ; bilateral sulcus and 2nd degree midline tears repaired by MD with lidocaine and stadol for pain; patient reported pain of 6 in recovery and given motrin per order; assisted with breastfeeding     Goal: Individualization and Mutuality  Outcome: Ongoing (interventions implemented as appropriate)   18   Individualization   Patient Specific Preferences NCB, RADHA care,  support, breastfeeding   Patient Specific Goals (Include Timeframe) Healthy mom and baby   Patient Specific Interventions Offer suggestions for positioning, coping with pain, support in going natural and achieving , assist with breastfeeding   Mutuality/Individual Preferences   What Anxieties, Fears, Concerns, or Questions Do You Have About Your Care? coping with the pain   What Information Would Help Us Give You More Personalized Care? desires    How Would You and/or Your Support Person Like to Participate in Your Care? fob and  at bedside as much as possible   Mutuality/Individual Preferences   How to Address Anxieties/Fears position suggestions, encouragement     Goal: Discharge Needs Assessment  Outcome: Ongoing (interventions implemented as appropriate)   18   Discharge Needs Assessment   Readmission Within the Last 30 Days no previous admission in last 30 days   Concerns to be Addressed no discharge needs identified   Patient/Family Anticipates Transition to home with family   Patient/Family Anticipated Services at Transition none   Transportation Concerns car, none   Transportation Anticipated car, drives self   Anticipated Changes Related to Illness none   Equipment Needed After Discharge none   Disability   Equipment  Currently Used at Home none       Problem: Labor (Cervical Ripen, Induct, Augment) (Adult,Obstetrics,Pediatric)  Goal: Signs and Symptoms of Listed Potential Problems Will be Absent, Minimized or Managed (Labor)  Outcome: Ongoing (interventions implemented as appropriate)   09/20/18 0558   Goal/Outcome Evaluation   Problems Assessed (Labor) all   Problems Present (Labor) pain       Problem: Postpartum (Vaginal Delivery) (Adult,Obstetrics,Pediatric)  Goal: Signs and Symptoms of Listed Potential Problems Will be Absent, Minimized or Managed (Postpartum)  Outcome: Ongoing (interventions implemented as appropriate)   09/20/18 3072   Goal/Outcome Evaluation   Problems Assessed (Postpartum Vaginal Delivery) all   Problems Present (Postpartum Vag Deliv) pain

## 2018-09-21 PROBLEM — D62 ACUTE BLOOD LOSS ANEMIA: Status: ACTIVE | Noted: 2018-09-21

## 2018-09-21 LAB
BASOPHILS # BLD AUTO: 0.01 10*3/MM3 (ref 0–0.2)
BASOPHILS # BLD AUTO: 0.02 10*3/MM3 (ref 0–0.2)
BASOPHILS NFR BLD AUTO: 0.1 % (ref 0–1.5)
BASOPHILS NFR BLD AUTO: 0.2 % (ref 0–1.5)
DEPRECATED RDW RBC AUTO: 53.9 FL (ref 37–54)
DEPRECATED RDW RBC AUTO: 54.4 FL (ref 37–54)
EOSINOPHIL # BLD AUTO: 0.08 10*3/MM3 (ref 0–0.7)
EOSINOPHIL # BLD AUTO: 0.08 10*3/MM3 (ref 0–0.7)
EOSINOPHIL NFR BLD AUTO: 0.6 % (ref 0.3–6.2)
EOSINOPHIL NFR BLD AUTO: 0.7 % (ref 0.3–6.2)
ERYTHROCYTE [DISTWIDTH] IN BLOOD BY AUTOMATED COUNT: 17.2 % (ref 11.7–13)
ERYTHROCYTE [DISTWIDTH] IN BLOOD BY AUTOMATED COUNT: 17.4 % (ref 11.7–13)
HCT VFR BLD AUTO: 20.3 % (ref 35.6–45.5)
HCT VFR BLD AUTO: 23 % (ref 35.6–45.5)
HGB BLD-MCNC: 6.5 G/DL (ref 11.9–15.5)
HGB BLD-MCNC: 7 G/DL (ref 11.9–15.5)
IMM GRANULOCYTES # BLD: 0.04 10*3/MM3 (ref 0–0.03)
IMM GRANULOCYTES # BLD: 0.04 10*3/MM3 (ref 0–0.03)
IMM GRANULOCYTES NFR BLD: 0.3 % (ref 0–0.5)
IMM GRANULOCYTES NFR BLD: 0.4 % (ref 0–0.5)
LYMPHOCYTES # BLD AUTO: 2.88 10*3/MM3 (ref 0.9–4.8)
LYMPHOCYTES # BLD AUTO: 3.01 10*3/MM3 (ref 0.9–4.8)
LYMPHOCYTES NFR BLD AUTO: 23.8 % (ref 19.6–45.3)
LYMPHOCYTES NFR BLD AUTO: 25.3 % (ref 19.6–45.3)
MCH RBC QN AUTO: 26.5 PG (ref 26.9–32)
MCH RBC QN AUTO: 27.5 PG (ref 26.9–32)
MCHC RBC AUTO-ENTMCNC: 30.4 G/DL (ref 32.4–36.3)
MCHC RBC AUTO-ENTMCNC: 32 G/DL (ref 32.4–36.3)
MCV RBC AUTO: 86 FL (ref 80.5–98.2)
MCV RBC AUTO: 87.1 FL (ref 80.5–98.2)
MONOCYTES # BLD AUTO: 0.54 10*3/MM3 (ref 0.2–1.2)
MONOCYTES # BLD AUTO: 0.67 10*3/MM3 (ref 0.2–1.2)
MONOCYTES NFR BLD AUTO: 4.7 % (ref 5–12)
MONOCYTES NFR BLD AUTO: 5.3 % (ref 5–12)
NEUTROPHILS # BLD AUTO: 7.88 10*3/MM3 (ref 1.9–8.1)
NEUTROPHILS # BLD AUTO: 8.84 10*3/MM3 (ref 1.9–8.1)
NEUTROPHILS NFR BLD AUTO: 69.2 % (ref 42.7–76)
NEUTROPHILS NFR BLD AUTO: 69.8 % (ref 42.7–76)
PLATELET # BLD AUTO: 170 10*3/MM3 (ref 140–500)
PLATELET # BLD AUTO: 202 10*3/MM3 (ref 140–500)
PMV BLD AUTO: 10.6 FL (ref 6–12)
PMV BLD AUTO: 10.7 FL (ref 6–12)
RBC # BLD AUTO: 2.36 10*6/MM3 (ref 3.9–5.2)
RBC # BLD AUTO: 2.64 10*6/MM3 (ref 3.9–5.2)
WBC NRBC COR # BLD: 11.39 10*3/MM3 (ref 4.5–10.7)
WBC NRBC COR # BLD: 12.66 10*3/MM3 (ref 4.5–10.7)

## 2018-09-21 PROCEDURE — 85025 COMPLETE CBC W/AUTO DIFF WBC: CPT | Performed by: OBSTETRICS & GYNECOLOGY

## 2018-09-21 PROCEDURE — 99024 POSTOP FOLLOW-UP VISIT: CPT | Performed by: OBSTETRICS & GYNECOLOGY

## 2018-09-21 RX ADMIN — IBUPROFEN 800 MG: 800 TABLET ORAL at 22:24

## 2018-09-21 RX ADMIN — DOCUSATE SODIUM 100 MG: 100 CAPSULE, LIQUID FILLED ORAL at 08:01

## 2018-09-21 RX ADMIN — Medication 1 TABLET: at 08:01

## 2018-09-21 RX ADMIN — DOCUSATE SODIUM 100 MG: 100 CAPSULE, LIQUID FILLED ORAL at 22:24

## 2018-09-21 RX ADMIN — IBUPROFEN 800 MG: 800 TABLET ORAL at 14:01

## 2018-09-21 RX ADMIN — IBUPROFEN 800 MG: 800 TABLET ORAL at 05:29

## 2018-09-21 NOTE — PROGRESS NOTES
"Postpartum Vaginal Delivery Note PPD #0    CC: Anemia post  delivery    Subjective:  I was called by the nurse due to hemoglobin of 6.5.  Predelivery hemoglobin was 11.  Patient had a  delivery.  She did have bilateral vaginal sidewall tears.  She has had a second-degree laceration.  Estimated blood loss at delivery was 400 cc.  Patient is not currently dizzy.  She is sitting up in bed.    Baby was tachycardic and thought to have an abnormality on heart ultrasound.  Baby was transferred to [place].  Repeat ultrasound per parents looks normal.  Baby is stable there and doing well.      ROS: No fever/chills. No N/V. No leg pain.    Vitals:   /68 (BP Location: Right arm, Patient Position: Sitting)   Pulse 82   Temp 97.6 °F (36.4 °C) (Oral)   Resp 16   Ht 170.2 cm (67\")   Wt 111 kg (245 lb 6.4 oz)   LMP 2017   SpO2 99%   Breastfeeding? Yes   BMI 38.44 kg/m²         Exam:   Gen: NAD, cooperative, conversive, sitting up in bed, mild pallor, no distress  Resp: Nonlabored  Abd: Soft, nondistended, fundus is firm below umbillicus, nontender  CV: Trace LE edema  Ext: Nontender bilaterally  Pad has minimal light bleeding    Labs:  Recent Results (from the past 36 hour(s))   CBC Auto Differential    Collection Time: 18  8:18 AM   Result Value Ref Range    WBC 11.39 (H) 4.50 - 10.70 10*3/mm3    RBC 2.36 (L) 3.90 - 5.20 10*6/mm3    Hemoglobin 6.5 (C) 11.9 - 15.5 g/dL    Hematocrit 20.3 (C) 35.6 - 45.5 %    MCV 86.0 80.5 - 98.2 fL    MCH 27.5 26.9 - 32.0 pg    MCHC 32.0 (L) 32.4 - 36.3 g/dL    RDW 17.4 (H) 11.7 - 13.0 %    RDW-SD 53.9 37.0 - 54.0 fl    MPV 10.6 6.0 - 12.0 fL    Platelets 170 140 - 500 10*3/mm3    Neutrophil % 69.2 42.7 - 76.0 %    Lymphocyte % 25.3 19.6 - 45.3 %    Monocyte % 4.7 (L) 5.0 - 12.0 %    Eosinophil % 0.7 0.3 - 6.2 %    Basophil % 0.1 0.0 - 1.5 %    Immature Grans % 0.4 0.0 - 0.5 %    Neutrophils, Absolute 7.88 1.90 - 8.10 10*3/mm3    Lymphocytes, Absolute 2.88 0.90 - " 4.80 10*3/mm3    Monocytes, Absolute 0.54 0.20 - 1.20 10*3/mm3    Eosinophils, Absolute 0.08 0.00 - 0.70 10*3/mm3    Basophils, Absolute 0.01 0.00 - 0.20 10*3/mm3    Immature Grans, Absolute 0.04 (H) 0.00 - 0.03 10*3/mm3       Patient Active Problem List   Diagnosis   • Previous  delivery affecting pregnancy   • Obesity affecting pregnancy   • Desires  (vaginal birth after ) trial   • Uterine size-date discrepancy in third trimester   • Group B Streptococcus carrier, +RV culture, currently pregnant   • SILVANA (amniotic fluid index) borderline low   • History of poor pregnancy outcome   • Pregnancy   • , delivered, current hospitalization       Assessment and Plan:  Matilda Cruz is a 32 y.o. female  s/p   1.  Severe anemia with hemoglobin of 6.5.  Repeat CBC was done to confirm and it agrees.  Patient is not currently symptomatic but she has not gotten up yet.  We discussed anemia in detail.  We will recheck CBC at noon.  Patient would like to consider blood transfusion if level is still low.  We discussed risk of blood transfusion including but not limited to risk of transmission of infectious agents, transmission of antibodies and transfusion reactions.  2. Continue medication prn for pain  3. Ambulation encouraged with assistance only.         Signed By:  Bao Dominguez MD       2018 9:34 AM

## 2018-09-22 LAB
DEPRECATED RDW RBC AUTO: 55.1 FL (ref 37–54)
ERYTHROCYTE [DISTWIDTH] IN BLOOD BY AUTOMATED COUNT: 17.4 % (ref 11.7–13)
HCT VFR BLD AUTO: 22 % (ref 35.6–45.5)
HGB BLD-MCNC: 6.8 G/DL (ref 11.9–15.5)
MCH RBC QN AUTO: 27.1 PG (ref 26.9–32)
MCHC RBC AUTO-ENTMCNC: 30.9 G/DL (ref 32.4–36.3)
MCV RBC AUTO: 87.6 FL (ref 80.5–98.2)
PLATELET # BLD AUTO: 186 10*3/MM3 (ref 140–500)
PMV BLD AUTO: 10.7 FL (ref 6–12)
RBC # BLD AUTO: 2.51 10*6/MM3 (ref 3.9–5.2)
WBC NRBC COR # BLD: 9.83 10*3/MM3 (ref 4.5–10.7)

## 2018-09-22 PROCEDURE — 86923 COMPATIBILITY TEST ELECTRIC: CPT

## 2018-09-22 PROCEDURE — 85027 COMPLETE CBC AUTOMATED: CPT | Performed by: OBSTETRICS & GYNECOLOGY

## 2018-09-22 PROCEDURE — 86900 BLOOD TYPING SEROLOGIC ABO: CPT

## 2018-09-22 PROCEDURE — 99024 POSTOP FOLLOW-UP VISIT: CPT | Performed by: OBSTETRICS & GYNECOLOGY

## 2018-09-22 PROCEDURE — 86901 BLOOD TYPING SEROLOGIC RH(D): CPT

## 2018-09-22 PROCEDURE — P9016 RBC LEUKOCYTES REDUCED: HCPCS

## 2018-09-22 PROCEDURE — 36430 TRANSFUSION BLD/BLD COMPNT: CPT

## 2018-09-22 RX ADMIN — MAGNESIUM HYDROXIDE 10 ML: 2400 SUSPENSION ORAL at 21:28

## 2018-09-22 RX ADMIN — DOCUSATE SODIUM 100 MG: 100 CAPSULE, LIQUID FILLED ORAL at 19:24

## 2018-09-22 RX ADMIN — IBUPROFEN 800 MG: 800 TABLET ORAL at 18:30

## 2018-09-22 RX ADMIN — Medication 1 TABLET: at 08:51

## 2018-09-22 RX ADMIN — IBUPROFEN 800 MG: 800 TABLET ORAL at 08:51

## 2018-09-22 RX ADMIN — DOCUSATE SODIUM 100 MG: 100 CAPSULE, LIQUID FILLED ORAL at 08:51

## 2018-09-22 NOTE — PROGRESS NOTES
"[facility]  Vaginal Delivery Progress Note    Subjective   Postpartum Day 1: Vaginal Delivery    The patient feels stable but notes she is lightheaded with sitting up. She denies severe headache. She has mild shortness of air with ambulation.   Her pain is well controlled.   She is ambulating well.  Patient describes her bleeding as staining only.    Breastfeeding: pumping, baby transferred to [facility] for heart rate abnormality    ROS:  Neuro: neg for headache or vision changes, pos for lightheadedness with sitting upright  Pulm: pos for shortness of breath with ambulation  CV: neg for chest pain or palpitations  : neg for heavy bleeding  GI: neg for n/v  Musculoskel: neg for leg pain    Objective     Vital Signs Range for the last 24 hours  Temperature: Temp:  [97.8 °F (36.6 °C)-98.2 °F (36.8 °C)] 98.2 °F (36.8 °C)   Temp Source: Temp src: Oral   BP: BP: (108-115)/(67-76) 108/67   Pulse: Heart Rate:  [66-96] 66   Respirations: Resp:  [16-18] 16   SPO2:     O2 Amount (l/min):     O2 Devices     Weight:       Admit Height:  Height: 170.2 cm (67\")      Physical Exam:  General:  no acute distress.  Heart: regular rate and rhythm  Lungs: normal respiratory effort, clear bilaterally  Abdomen: Fundus: appropriate, firm, non tender  Extremities: bilateral lower ext without cords or tenderness, trace edema      Lab results reviewed:    Lab Results   Component Value Date    WBC 9.83 09/22/2018    HGB 6.8 (C) 09/22/2018    HCT 22.0 (L) 09/22/2018    MCV 87.6 09/22/2018     09/22/2018       Results from last 7 days  Lab Units 09/19/18  0647   ABO TYPING  A   RH TYPING  Positive       Rubella:      Rubella Antibodies, IgG   Date Value Ref Range Status   02/23/2018 4.18 Immune >0.99 index Final     Comment:                                     Non-immune       <0.90                                  Equivocal  0.90 - 0.99                                  Immune           >0.99       Rh Status:    Rh Factor   Date " Value Ref Range Status   2018 Positive  Final     Comment:     Please note: Prior records for this patient's ABO / Rh type are not  available for additional verification.       RH type   Date Value Ref Range Status   2018 Positive  Final     Immunizations:   Immunization History   Administered Date(s) Administered   • Tdap 2018       Assessment/Plan     Active Problems:    Previous  delivery affecting pregnancy    Obesity affecting pregnancy    Desires  (vaginal birth after ) trial    Group B Streptococcus carrier, +RV culture, currently pregnant    SILVANA (amniotic fluid index) borderline low    History of poor pregnancy outcome    Pregnancy    , delivered, current hospitalization    Acute blood loss anemia      Matilda Cruz is Day 1  post-partum  , Spontaneous  : pt with severe anemia. Discussed options and patient would now like to proceed with blood transfusion. She is now experiencing lightheadedness with sitting and some shortness of air. She also spoke by phone with her primary OB, Dr. Laird. They discussed options and she decided she to have transfusion. Patient counseled regarding risks of blood transfusion to include  transmission of infectious diseases including HIV and hepatits, transfusion reactions which can be life-threatening and development of antibodies which could complicate future pregnancies.     Plan:  Proceed with transfusion of 2 units PRBC's      Kaity Cruz MD  2018  9:46 AM

## 2018-09-22 NOTE — PROGRESS NOTES
Contacted patient's RN regarding order for additional unit of blood placed in epic this afternoon. Confirmed that patient is getting 2 units of blood total. There were technical difficulties with the second unit and it was discarded due to concerns over the operation of this bag per RN. Therefore, pt receiving 2 units as planned/ 3rd unit ordered since one had to be discarded.

## 2018-09-22 NOTE — PLAN OF CARE
Problem: Patient Care Overview  Goal: Plan of Care Review  Outcome: Ongoing (interventions implemented as appropriate)   09/22/18 0638   Coping/Psychosocial   Plan of Care Reviewed With patient   Plan of Care Review   Progress improving   OTHER   Outcome Summary Routine pp care day 1, infant at [facility]. Pt hmg dropped from 11.6 to 6.5 on 9/21. At 1200 increased to 7. Awaiting am labs.Not symtomatic     Goal: Individualization and Mutuality  Outcome: Ongoing (interventions implemented as appropriate)    Goal: Discharge Needs Assessment  Outcome: Ongoing (interventions implemented as appropriate)      Problem: Postpartum (Vaginal Delivery) (Adult,Obstetrics,Pediatric)  Goal: Signs and Symptoms of Listed Potential Problems Will be Absent, Minimized or Managed (Postpartum)  Outcome: Ongoing (interventions implemented as appropriate)      Problem: Breastfeeding (Adult,Obstetrics,Pediatric)  Goal: Signs and Symptoms of Listed Potential Problems Will be Absent, Minimized or Managed (Breastfeeding)  Outcome: Ongoing (interventions implemented as appropriate)

## 2018-09-22 NOTE — LACTATION NOTE
Pt continues to pump.  Receiving blood today for low hgb.  Pt hopeful she and infant will be released tomorrow.  Encouraged follow up in [facility] as needed to facilitate latching.  Discussed milk supply expectations.  Pt states she never made milk with first son and will monitor closely this time with LC.

## 2018-09-23 VITALS
BODY MASS INDEX: 38.52 KG/M2 | OXYGEN SATURATION: 99 % | WEIGHT: 245.4 LBS | RESPIRATION RATE: 18 BRPM | HEART RATE: 65 BPM | DIASTOLIC BLOOD PRESSURE: 67 MMHG | TEMPERATURE: 98.2 F | SYSTOLIC BLOOD PRESSURE: 100 MMHG | HEIGHT: 67 IN

## 2018-09-23 LAB
ABO + RH BLD: NORMAL
BASOPHILS # BLD AUTO: 0.03 10*3/MM3 (ref 0–0.2)
BASOPHILS NFR BLD AUTO: 0.3 % (ref 0–1.5)
BH BB BLOOD EXPIRATION DATE: NORMAL
BH BB BLOOD TYPE BARCODE: 6200
BH BB DISPENSE STATUS: NORMAL
BH BB PRODUCT CODE: NORMAL
BH BB UNIT NUMBER: NORMAL
DEPRECATED RDW RBC AUTO: 52.8 FL (ref 37–54)
EOSINOPHIL # BLD AUTO: 0.28 10*3/MM3 (ref 0–0.7)
EOSINOPHIL NFR BLD AUTO: 3 % (ref 0.3–6.2)
ERYTHROCYTE [DISTWIDTH] IN BLOOD BY AUTOMATED COUNT: 16.6 % (ref 11.7–13)
HCT VFR BLD AUTO: 29 % (ref 35.6–45.5)
HGB BLD-MCNC: 9 G/DL (ref 11.9–15.5)
IMM GRANULOCYTES # BLD: 0.05 10*3/MM3 (ref 0–0.03)
IMM GRANULOCYTES NFR BLD: 0.5 % (ref 0–0.5)
LYMPHOCYTES # BLD AUTO: 2.64 10*3/MM3 (ref 0.9–4.8)
LYMPHOCYTES NFR BLD AUTO: 28.1 % (ref 19.6–45.3)
MCH RBC QN AUTO: 27.2 PG (ref 26.9–32)
MCHC RBC AUTO-ENTMCNC: 31 G/DL (ref 32.4–36.3)
MCV RBC AUTO: 87.6 FL (ref 80.5–98.2)
MONOCYTES # BLD AUTO: 0.46 10*3/MM3 (ref 0.2–1.2)
MONOCYTES NFR BLD AUTO: 4.9 % (ref 5–12)
NEUTROPHILS # BLD AUTO: 5.92 10*3/MM3 (ref 1.9–8.1)
NEUTROPHILS NFR BLD AUTO: 63.2 % (ref 42.7–76)
PLATELET # BLD AUTO: 198 10*3/MM3 (ref 140–500)
PMV BLD AUTO: 10.5 FL (ref 6–12)
RBC # BLD AUTO: 3.31 10*6/MM3 (ref 3.9–5.2)
UNIT  ABO: NORMAL
UNIT  RH: NORMAL
WBC NRBC COR # BLD: 9.38 10*3/MM3 (ref 4.5–10.7)

## 2018-09-23 PROCEDURE — 99024 POSTOP FOLLOW-UP VISIT: CPT | Performed by: OBSTETRICS & GYNECOLOGY

## 2018-09-23 PROCEDURE — 85025 COMPLETE CBC W/AUTO DIFF WBC: CPT | Performed by: OBSTETRICS & GYNECOLOGY

## 2018-09-23 RX ORDER — IBUPROFEN 800 MG/1
800 TABLET ORAL EVERY 8 HOURS PRN
Qty: 28 TABLET | Refills: 1 | Status: SHIPPED | OUTPATIENT
Start: 2018-09-23 | End: 2019-02-13

## 2018-09-23 RX ORDER — OXYCODONE HYDROCHLORIDE AND ACETAMINOPHEN 5; 325 MG/1; MG/1
1 TABLET ORAL EVERY 4 HOURS PRN
Qty: 18 TABLET | Refills: 0 | Status: SHIPPED | OUTPATIENT
Start: 2018-09-23 | End: 2018-09-30

## 2018-09-23 RX ORDER — FERROUS SULFATE 325(65) MG
325 TABLET ORAL
Qty: 30 TABLET | Refills: 1 | Status: SHIPPED | OUTPATIENT
Start: 2018-09-23 | End: 2019-02-13

## 2018-09-23 RX ORDER — PSEUDOEPHEDRINE HCL 30 MG
100 TABLET ORAL 2 TIMES DAILY
Qty: 20 CAPSULE | Refills: 0 | Status: SHIPPED | OUTPATIENT
Start: 2018-09-23 | End: 2019-02-13

## 2018-09-23 RX ADMIN — Medication 1 TABLET: at 09:19

## 2018-09-23 RX ADMIN — DOCUSATE SODIUM 100 MG: 100 CAPSULE, LIQUID FILLED ORAL at 09:19

## 2018-09-23 RX ADMIN — IBUPROFEN 800 MG: 800 TABLET ORAL at 13:15

## 2018-09-23 NOTE — PLAN OF CARE
Problem: Patient Care Overview  Goal: Plan of Care Review  Outcome: Ongoing (interventions implemented as appropriate)   09/23/18 0607   Coping/Psychosocial   Plan of Care Reviewed With patient   Plan of Care Review   Progress improving   OTHER   Outcome Summary 2 units blood transfused 8/22/2018. Awaiting am labs. Pt appears stable.     Goal: Individualization and Mutuality  Outcome: Ongoing (interventions implemented as appropriate)    Goal: Discharge Needs Assessment  Outcome: Ongoing (interventions implemented as appropriate)      Problem: Postpartum (Vaginal Delivery) (Adult,Obstetrics,Pediatric)  Goal: Signs and Symptoms of Listed Potential Problems Will be Absent, Minimized or Managed (Postpartum)  Outcome: Ongoing (interventions implemented as appropriate)      Problem: Breastfeeding (Adult,Obstetrics,Pediatric)  Goal: Signs and Symptoms of Listed Potential Problems Will be Absent, Minimized or Managed (Breastfeeding)  Outcome: Ongoing (interventions implemented as appropriate)

## 2018-09-23 NOTE — DISCHARGE SUMMARY
PPD 3  Discharge Summary/Progress     Sulci tears-  Transfusionx 2      This  female, was admitted on 2018 and underwent a , Spontaneous  on 2018 , resulting in the birth of the following:  Infant    Findings:    Infant observations:   Birth Information  YOB: 2017   Time of birth: 1:26 AM   Delivering clinician: Lorie Laird   Sex: male   Delivery type: , Spontaneous   Breech type (if applicable):     Observed anomalies/comments: scale 4         Observations/Comments:  scale 4     Apgars: @DELRECITEM(HSB,13903,,1,,)8)@ @ 1 minute /    @DELRECITEM(HSB,83664,,1,,)9)@ @ 5 minutes     LABS:   Lab Results (last 72 hours)     Procedure Component Value Units Date/Time    CBC & Differential [[id number]] Collected:  18    Specimen:  Blood Updated:  18    Narrative:       The following orders were created for panel order CBC & Differential.  Procedure                               Abnormality         Status                     ---------                               -----------         ------                     CBC Auto Differential[[id number]]        Abnormal            Final result                 Please view results for these tests on the individual orders.    CBC Auto Differential [[id number]]  (Abnormal) Collected:  18    Specimen:  Blood Updated:  18     WBC 9.38 10*3/mm3      RBC 3.31 (L) 10*6/mm3      Hemoglobin 9.0 (L) g/dL      Hematocrit 29.0 (L) %      MCV 87.6 fL      MCH 27.2 pg      MCHC 31.0 (L) g/dL      RDW 16.6 (H) %      RDW-SD 52.8 fl      MPV 10.5 fL      Platelets 198 10*3/mm3      Neutrophil % 63.2 %      Lymphocyte % 28.1 %      Monocyte % 4.9 (L) %      Eosinophil % 3.0 %      Basophil % 0.3 %      Immature Grans % 0.5 %      Neutrophils, Absolute 5.92 10*3/mm3      Lymphocytes, Absolute 2.64 10*3/mm3      Monocytes, Absolute 0.46 10*3/mm3      Eosinophils, Absolute 0.28 10*3/mm3      Basophils,  Absolute 0.03 10*3/mm3      Immature Grans, Absolute 0.05 (H) 10*3/mm3     CBC (No Diff) [[id number]]  (Abnormal) Collected:  09/22/18 0647    Specimen:  Blood Updated:  09/22/18 0746     WBC 9.83 10*3/mm3      RBC 2.51 (L) 10*6/mm3      Hemoglobin 6.8 (C) g/dL      Hematocrit 22.0 (L) %      MCV 87.6 fL      MCH 27.1 pg      MCHC 30.9 (L) g/dL      RDW 17.4 (H) %      RDW-SD 55.1 (H) fl      MPV 10.7 fL      Platelets 186 10*3/mm3     CBC & Differential [[id number]] Collected:  09/21/18 1157    Specimen:  Blood Updated:  09/21/18 1219    Narrative:       The following orders were created for panel order CBC & Differential.  Procedure                               Abnormality         Status                     ---------                               -----------         ------                     CBC Auto Differential[[id number]]        Abnormal            Final result                 Please view results for these tests on the individual orders.    CBC Auto Differential [[id number]]  (Abnormal) Collected:  09/21/18 1157    Specimen:  Blood Updated:  09/21/18 1219     WBC 12.66 (H) 10*3/mm3      RBC 2.64 (L) 10*6/mm3      Hemoglobin 7.0 (L) g/dL      Hematocrit 23.0 (L) %      MCV 87.1 fL      MCH 26.5 (L) pg      MCHC 30.4 (L) g/dL      RDW 17.2 (H) %      RDW-SD 54.4 (H) fl      MPV 10.7 fL      Platelets 202 10*3/mm3      Neutrophil % 69.8 %      Lymphocyte % 23.8 %      Monocyte % 5.3 %      Eosinophil % 0.6 %      Basophil % 0.2 %      Immature Grans % 0.3 %      Neutrophils, Absolute 8.84 (H) 10*3/mm3      Lymphocytes, Absolute 3.01 10*3/mm3      Monocytes, Absolute 0.67 10*3/mm3      Eosinophils, Absolute 0.08 10*3/mm3      Basophils, Absolute 0.02 10*3/mm3      Immature Grans, Absolute 0.04 (H) 10*3/mm3     CBC & Differential [[id number]] Collected:  09/21/18 0545    Specimen:  Blood Updated:  09/21/18 0910    Narrative:       The following orders were created for panel order CBC & Differential.  Procedure                                Abnormality         Status                     ---------                               -----------         ------                     Scan Slide[[id number]]                                                                  CBC Auto Differential[[id number]]        Abnormal            Final result                 Please view results for these tests on the individual orders.    CBC Auto Differential [[id number]]  (Abnormal) Collected:  18    Specimen:  Blood Updated:  18     WBC 11.39 (H) 10*3/mm3      RBC 2.36 (L) 10*6/mm3      Hemoglobin 6.5 (C) g/dL      Hematocrit 20.3 (C) %      MCV 86.0 fL      MCH 27.5 pg      MCHC 32.0 (L) g/dL      RDW 17.4 (H) %      RDW-SD 53.9 fl      MPV 10.6 fL      Platelets 170 10*3/mm3      Neutrophil % 69.2 %      Lymphocyte % 25.3 %      Monocyte % 4.7 (L) %      Eosinophil % 0.7 %      Basophil % 0.1 %      Immature Grans % 0.4 %      Neutrophils, Absolute 7.88 10*3/mm3      Lymphocytes, Absolute 2.88 10*3/mm3      Monocytes, Absolute 0.54 10*3/mm3      Eosinophils, Absolute 0.08 10*3/mm3      Basophils, Absolute 0.01 10*3/mm3      Immature Grans, Absolute 0.04 (H) 10*3/mm3           ROS:  Pulm: neg for soa  GI: neg for heavy bleeding  Musculoskel: neg for leg pain    ASSESSMENT/DISCHARGE SUMMARY    Post delivery the patient did well. She was tolerating a regular diet, ambulating, voiding and passing flatus. Post delivery hemoglobin was   Lab Results   Component Value Date    HGB 9.0 (L) 2018   .     On day of discharge, uterus was firm, extremities were non tender with no erythema or masses. Lochia was normal.    Pt was given prescriptions for Percocet 5/325 and Motrin 800 mg PRN for pain.    Instructed to call the office to make an appointment in  6 weeks after your delivery.    Please call the office, or the OB/GYN on-call if after-hours, for any questions, concerns or any of the followin) Fever - a temperature greater than  100.4  2) Uncontrolled pain  3) Uncontrolled bleeding (soaking more than 1 pad in an hour)  4) Foul-smelling discharge from the vagina    Do not place anything in the vagina - this includes tampons, douches or having sex - until after your 6 week postpartum visit .    Frank Trejo MD    9/23/2018  9:46 AM

## 2018-09-23 NOTE — LACTATION NOTE
Pt hopeful for discharge today.  Reviewed when to expect breast changes and [facility].  Pt denies questions/concerns at this time.

## 2018-09-27 ENCOUNTER — TELEPHONE (OUTPATIENT)
Dept: OBSTETRICS AND GYNECOLOGY | Age: 33
End: 2018-09-27

## 2018-09-27 NOTE — TELEPHONE ENCOUNTER
Any headache, blurry vision?  If not and feels ok otherwise it is fine to observe for tonight.  Elevate legs and try to stay off feet tonight.  Increase water intake.  It is not uncommon during this time period to see an increase in swelling but if other symptoms develop we would want to see her to evaluate.

## 2018-09-27 NOTE — TELEPHONE ENCOUNTER
Dr Laird pt, 1 wk PP. Pt states she still has pitting edema in bilateral lower extremities. Pt denies any pain, redness or warmth. Pt aware it is common to have swelling PP. Please advise.    Pt # [phone]

## 2018-09-30 ENCOUNTER — TELEPHONE (OUTPATIENT)
Dept: LACTATION | Facility: HOSPITAL | Age: 33
End: 2018-09-30

## 2018-09-30 NOTE — TELEPHONE ENCOUNTER
Mom reports baby BF good but sometimes still seems hungry when she lays him down. Educated on importance of deep latching. She pumps about 2 oz after he BF. She supplements with that if he is still hungry. Baby has not had formula now for 2 days. Educated mom on breast massage with BF. Mom may make glenda. Soon to check baby's wt and latch.

## 2018-10-17 ENCOUNTER — POSTPARTUM VISIT (OUTPATIENT)
Dept: OBSTETRICS AND GYNECOLOGY | Age: 33
End: 2018-10-17

## 2018-10-17 VITALS
WEIGHT: 222 LBS | BODY MASS INDEX: 34.84 KG/M2 | HEIGHT: 67 IN | SYSTOLIC BLOOD PRESSURE: 110 MMHG | DIASTOLIC BLOOD PRESSURE: 70 MMHG

## 2018-10-17 DIAGNOSIS — O34.219 VBAC, DELIVERED, CURRENT HOSPITALIZATION: ICD-10-CM

## 2018-10-17 DIAGNOSIS — Z13.89 SCREENING FOR BLOOD OR PROTEIN IN URINE: ICD-10-CM

## 2018-10-17 PROBLEM — Z87.59 HISTORY OF POOR PREGNANCY OUTCOME: Status: RESOLVED | Noted: 2018-08-22 | Resolved: 2018-10-17

## 2018-10-17 PROBLEM — Z34.90 PREGNANCY: Status: RESOLVED | Noted: 2018-09-19 | Resolved: 2018-10-17

## 2018-10-17 PROBLEM — O28.8 AFI (AMNIOTIC FLUID INDEX) BORDERLINE LOW: Status: RESOLVED | Noted: 2018-08-22 | Resolved: 2018-10-17

## 2018-10-17 PROBLEM — O26.843 UTERINE SIZE-DATE DISCREPANCY IN THIRD TRIMESTER: Status: RESOLVED | Noted: 2018-07-09 | Resolved: 2018-10-17

## 2018-10-17 PROBLEM — O99.210 OBESITY AFFECTING PREGNANCY: Status: RESOLVED | Noted: 2018-02-23 | Resolved: 2018-10-17

## 2018-10-17 PROBLEM — D62 ACUTE BLOOD LOSS ANEMIA: Status: RESOLVED | Noted: 2018-09-21 | Resolved: 2018-10-17

## 2018-10-17 PROBLEM — O99.820 GROUP B STREPTOCOCCUS CARRIER, +RV CULTURE, CURRENTLY PREGNANT: Status: RESOLVED | Noted: 2018-08-18 | Resolved: 2018-10-17

## 2018-10-17 LAB
BILIRUB BLD-MCNC: ABNORMAL MG/DL
GLUCOSE UR STRIP-MCNC: NEGATIVE MG/DL
KETONES UR QL: ABNORMAL
LEUKOCYTE EST, POC: ABNORMAL
NITRITE UR-MCNC: NEGATIVE MG/ML
PH UR: 6 [PH] (ref 5–8)
PROT UR STRIP-MCNC: ABNORMAL MG/DL
RBC # UR STRIP: ABNORMAL /UL
SP GR UR: 1.02 (ref 1–1.03)
UROBILINOGEN UR QL: NORMAL

## 2018-10-17 PROCEDURE — 81002 URINALYSIS NONAUTO W/O SCOPE: CPT | Performed by: OBSTETRICS & GYNECOLOGY

## 2018-10-17 PROCEDURE — 0503F POSTPARTUM CARE VISIT: CPT | Performed by: OBSTETRICS & GYNECOLOGY

## 2018-10-17 RX ORDER — SENNA AND DOCUSATE SODIUM 50; 8.6 MG/1; MG/1
2 TABLET, FILM COATED ORAL 2 TIMES DAILY
Qty: 60 TABLET | Refills: 2 | Status: SHIPPED | OUTPATIENT
Start: 2018-10-17 | End: 2019-02-13

## 2018-10-17 NOTE — PROGRESS NOTES
"Subjective   Matilda Cruz is a 33 y.o. female who presents for a postpartum visit. She is 4 weeks postpartum following a . I have fully reviewed the prenatal and intrapartum course. The delivery was at 40 gestational weeks. Outcome: vaginal birth after  (). Anesthesia: none. Postpartum course has been c/b transfusion for blood loss. Baby's course has been uncomplicated. Baby is feeding by breast. Bleeding no bleeding. Bowel function is normal. Bladder function is normal. Patient is not sexually active. Contraception method is undecided. Postpartum depression screening: negative.    The following portions of the patient's history were reviewed and updated as appropriate: allergies, current medications, past family history, past medical history, past social history, past surgical history and problem list.    Review of Systems  Pertinent items are noted in HPI.    Objective   /70   Ht 170.2 cm (67\")   Wt 101 kg (222 lb)   LMP 2017   Breastfeeding? Yes   BMI 34.77 kg/m²    General:  alert, appears stated age and cooperative    Vulva:  not evaluated   Vagina: not evaluated   Assessment/Plan   postpartum exam. Pap smear not done at today's visit.    1. Contraception: undecided  2. Constipation - senna -s and miralax  3. Follow up in: 4 weeks or as needed.           "

## 2018-11-02 ENCOUNTER — TELEPHONE (OUTPATIENT)
Dept: OBSTETRICS AND GYNECOLOGY | Age: 33
End: 2018-11-02

## 2018-11-02 NOTE — TELEPHONE ENCOUNTER
I would continue the miralax as needed to keep stools as soft as possible as well as daily colace, high fiber foods and increase water intake.  Tucks pads pay help with discomfort as well as sitz baths. But the best thing she can do right now is keep stools soft so area can heal.

## 2018-11-02 NOTE — TELEPHONE ENCOUNTER
5 weeks post partum     When she came in for her PP visit on 10/17/2018 Dr. Laird told her to take miralax for constipation.  Patient states she feels she has anal fissure. And is wondering what she can do about it? States when she has a BM it is extremely painful

## 2018-11-12 ENCOUNTER — POSTPARTUM VISIT (OUTPATIENT)
Dept: OBSTETRICS AND GYNECOLOGY | Age: 33
End: 2018-11-12

## 2018-11-12 VITALS
HEIGHT: 67 IN | WEIGHT: 215 LBS | DIASTOLIC BLOOD PRESSURE: 70 MMHG | BODY MASS INDEX: 33.74 KG/M2 | SYSTOLIC BLOOD PRESSURE: 110 MMHG

## 2018-11-12 DIAGNOSIS — R10.2 PELVIC PRESSURE IN FEMALE: ICD-10-CM

## 2018-11-12 PROBLEM — O34.219 VBAC, DELIVERED, CURRENT HOSPITALIZATION: Status: RESOLVED | Noted: 2018-09-20 | Resolved: 2018-11-12

## 2018-11-12 PROCEDURE — 0503F POSTPARTUM CARE VISIT: CPT | Performed by: OBSTETRICS & GYNECOLOGY

## 2018-11-12 NOTE — PROGRESS NOTES
"Subjective   Matilda Cruz is a 33 y.o. female who presents for a postpartum visit. She is 7 weeks postpartum following a spontaneous vaginal delivery. I have fully reviewed the prenatal and intrapartum course. The delivery was at 40 gestational weeks. Outcome: vaginal birth after  (). Anesthesia: none. Postpartum course has been uncomplicated. Baby's course has been uncomplicated. Baby is feeding by breast. Bleeding no bleeding. Bowel function is normal. Bladder function is normal. Patient is sexually active. Contraception method is condoms. Postpartum depression screening: negative.    The following portions of the patient's history were reviewed and updated as appropriate: allergies, current medications, past family history, past medical history, past social history, past surgical history and problem list.    Review of Systems  Pertinent items are noted in HPI.    Objective   /70   Ht 170.2 cm (67\")   Wt 97.5 kg (215 lb)   LMP  (LMP Unknown)   Breastfeeding? Yes   BMI 33.67 kg/m²    General:  alert, appears stated age and cooperative    Vulva:  normal   Vagina: normal vagina, no discharge, exudate, lesion, or erythema   Assessment/Plan   postpartum exam. Pap smear not done at today's visit.    1. Contraception: condoms  2. Pelvic pressure - [facility]    3. Follow up in: 3 months for annual  or as needed.           "

## 2019-02-13 ENCOUNTER — OFFICE VISIT (OUTPATIENT)
Dept: OBSTETRICS AND GYNECOLOGY | Age: 34
End: 2019-02-13

## 2019-02-13 VITALS
HEIGHT: 67 IN | SYSTOLIC BLOOD PRESSURE: 120 MMHG | WEIGHT: 220 LBS | DIASTOLIC BLOOD PRESSURE: 70 MMHG | BODY MASS INDEX: 34.53 KG/M2

## 2019-02-13 DIAGNOSIS — Z01.419 ENCOUNTER FOR GYNECOLOGICAL EXAMINATION WITHOUT ABNORMAL FINDING: Primary | ICD-10-CM

## 2019-02-13 DIAGNOSIS — Z12.4 SCREENING FOR MALIGNANT NEOPLASM OF CERVIX: ICD-10-CM

## 2019-02-13 DIAGNOSIS — Z30.09 ENCOUNTER FOR OTHER GENERAL COUNSELING OR ADVICE ON CONTRACEPTION: ICD-10-CM

## 2019-02-13 DIAGNOSIS — Z13.89 SCREENING FOR BLOOD OR PROTEIN IN URINE: ICD-10-CM

## 2019-02-13 PROBLEM — R10.2 PELVIC PRESSURE IN FEMALE: Status: RESOLVED | Noted: 2018-11-12 | Resolved: 2019-02-13

## 2019-02-13 PROCEDURE — 99395 PREV VISIT EST AGE 18-39: CPT | Performed by: OBSTETRICS & GYNECOLOGY

## 2019-02-13 PROCEDURE — 81002 URINALYSIS NONAUTO W/O SCOPE: CPT | Performed by: OBSTETRICS & GYNECOLOGY

## 2019-02-13 NOTE — PROGRESS NOTES
"Subjective   Matilda Cruz is a 33 y.o. female who presents for annual exam - denies complaints - discharged from [facility] today and doing so much better - considering BC - options discussed - will let me know what she decides    History of Present Illness    The following portions of the patient's history were reviewed and updated as appropriate: allergies, current medications, past family history, past medical history, past social history, past surgical history and problem list.    Review of Systems   Constitutional: Negative for chills and fever.   Gastrointestinal: Negative for abdominal distention and abdominal pain.   Genitourinary: Negative for dyspareunia, dysuria, menstrual problem, pelvic pain, vaginal bleeding, vaginal discharge and vaginal pain.   All other systems reviewed and are negative.  /70   Ht 170.2 cm (67\")   Wt 99.8 kg (220 lb)   LMP  (LMP Unknown)   Breastfeeding? Yes   BMI 34.46 kg/m²       Objective   Physical Exam   Constitutional: She is oriented to person, place, and time. She appears well-developed and well-nourished.   Neck: Normal range of motion. Neck supple. No thyromegaly present.   Cardiovascular: Normal rate and regular rhythm.   Pulmonary/Chest: Effort normal and breath sounds normal. Right breast exhibits no mass, no nipple discharge, no skin change and no tenderness. Left breast exhibits no mass, no nipple discharge, no skin change and no tenderness.   Abdominal: Soft. Bowel sounds are normal. She exhibits no distension. There is no tenderness.   Genitourinary: Vagina normal and uterus normal. Pelvic exam was performed with patient supine. Uterus is not tender. Cervix exhibits no friability. Right adnexum displays no mass and no tenderness. Left adnexum displays no mass and no tenderness. No vaginal discharge found.   Musculoskeletal: Normal range of motion. She exhibits no edema.   Neurological: She is alert and oriented to person, place, and time.   Skin: Skin is " warm and dry. No rash noted.   Psychiatric: She has a normal mood and affect. Her behavior is normal.   Nursing note and vitals reviewed.        Assessment/Plan   Matilda was seen today for gynecologic exam.    Diagnoses and all orders for this visit:    Encounter for gynecological examination without abnormal finding    Screening for blood or protein in urine  -     POC Urinalysis Dipstick    Screening for malignant neoplasm of cervix  -     IGP, Rfx Aptima HPV ASCU    Encounter for other general counseling or advice on contraception      Counseling was given to patient for the following topics: self-breast exams .   Counseling was given to patient for the following topics: risks and benefits of treatment options and contraceptive options .

## 2019-02-15 LAB
CONV .: NORMAL
CYTOLOGIST CVX/VAG CYTO: NORMAL
CYTOLOGY CVX/VAG DOC THIN PREP: NORMAL
DX ICD CODE: NORMAL
HIV 1 & 2 AB SER-IMP: NORMAL
OTHER STN SPEC: NORMAL
PATH REPORT.FINAL DX SPEC: NORMAL
STAT OF ADQ CVX/VAG CYTO-IMP: NORMAL

## 2019-02-18 ENCOUNTER — TELEPHONE (OUTPATIENT)
Dept: OBSTETRICS AND GYNECOLOGY | Age: 34
End: 2019-02-18

## 2019-02-18 NOTE — TELEPHONE ENCOUNTER
----- Message from Lorie Laird MD sent at 2/17/2019  5:10 PM EST -----  Please call patient and notify of normal results of pap smear

## 2019-02-27 ENCOUNTER — TELEPHONE (OUTPATIENT)
Dept: OBSTETRICS AND GYNECOLOGY | Age: 34
End: 2019-02-27

## 2019-03-20 ENCOUNTER — OFFICE VISIT (OUTPATIENT)
Dept: OBSTETRICS AND GYNECOLOGY | Age: 34
End: 2019-03-20

## 2019-03-20 VITALS
BODY MASS INDEX: 34.69 KG/M2 | HEIGHT: 67 IN | SYSTOLIC BLOOD PRESSURE: 120 MMHG | DIASTOLIC BLOOD PRESSURE: 70 MMHG | WEIGHT: 221 LBS

## 2019-03-20 DIAGNOSIS — K62.89 ANAL OR RECTAL PAIN: ICD-10-CM

## 2019-03-20 DIAGNOSIS — Z30.430 ENCOUNTER FOR INSERTION OF INTRAUTERINE CONTRACEPTIVE DEVICE (IUD): Primary | ICD-10-CM

## 2019-03-20 DIAGNOSIS — Z13.9 SPECIAL SCREENING: ICD-10-CM

## 2019-03-20 LAB
B-HCG UR QL: NEGATIVE
INTERNAL NEGATIVE CONTROL: NEGATIVE
INTERNAL POSITIVE CONTROL: POSITIVE
Lab: NORMAL

## 2019-03-20 PROCEDURE — 58300 INSERT INTRAUTERINE DEVICE: CPT | Performed by: OBSTETRICS & GYNECOLOGY

## 2019-03-20 PROCEDURE — 81025 URINE PREGNANCY TEST: CPT | Performed by: OBSTETRICS & GYNECOLOGY

## 2019-03-20 RX ORDER — COPPER 313.4 MG/1
INTRAUTERINE DEVICE INTRAUTERINE ONCE
Status: COMPLETED | OUTPATIENT
Start: 2019-03-20 | End: 2019-03-20

## 2019-03-20 RX ADMIN — COPPER: 313.4 INTRAUTERINE DEVICE INTRAUTERINE at 16:57

## 2019-03-20 NOTE — PROGRESS NOTES
Procedure   Procedures    IUD Insertion Procedure Note    Pre-operative Diagnosis: contraception    Post-operative Diagnosis: same    Indications: contraception    Procedure Details   Urine pregnancy test was done and result was neg.  The risks (including infection, bleeding, pain, and uterine perforation) and benefits of the procedure were explained to the patient and Written informed consent was obtained.      Cervix cleansed with Betadine. Uterus sounded to 7 cm. IUD inserted without difficulty. String visible and trimmed.    IUD Information:  ParaGard, Lot # [id number], Expiration date Jan 2025.    Condition:  Stable    Complications:  None  Patient tolerated the procedure well without complications.    Plan:    The patient was advised to call for any fever or for prolonged or severe pain or bleeding. She was advised to use NSAID as needed for mild to moderate pain.     Attending Physician Documentation:  I was present for or participated in the entire procedure, including opening and closing.

## 2019-03-22 ENCOUNTER — TELEPHONE (OUTPATIENT)
Dept: OBSTETRICS AND GYNECOLOGY | Age: 34
End: 2019-03-22

## 2019-03-22 NOTE — TELEPHONE ENCOUNTER
Irregular bleeding with paragard is not unexpected.  As long as it is not greater than a pad an hour she can continue to observe.

## 2019-03-22 NOTE — TELEPHONE ENCOUNTER
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

## 2019-04-12 ENCOUNTER — OFFICE VISIT (OUTPATIENT)
Dept: SURGERY | Facility: CLINIC | Age: 34
End: 2019-04-12

## 2019-04-12 VITALS
SYSTOLIC BLOOD PRESSURE: 124 MMHG | HEART RATE: 96 BPM | TEMPERATURE: 98 F | BODY MASS INDEX: 34.53 KG/M2 | DIASTOLIC BLOOD PRESSURE: 78 MMHG | WEIGHT: 220 LBS | OXYGEN SATURATION: 98 % | HEIGHT: 67 IN

## 2019-04-12 DIAGNOSIS — IMO0001 PERINEAL LACERATION, INITIAL ENCOUNTER: ICD-10-CM

## 2019-04-12 DIAGNOSIS — K60.2 ANAL FISSURE: Primary | ICD-10-CM

## 2019-04-12 PROCEDURE — 99244 OFF/OP CNSLTJ NEW/EST MOD 40: CPT | Performed by: COLON & RECTAL SURGERY

## 2019-04-12 NOTE — PROGRESS NOTES
Matilda Cruz is a 33 y.o. female who is seen as a consult at the request of Taiwo Lainez MD for Rectal Pain.      HPI:    Pt c/o pain in her bottom    In 2018 she had  with Dr. Lainez  Had 3rd degree tear with delivery  When she got home, she started taking stool softener    After being home for 2-3 weeks, she had a hard stool, so upped her dosage of miralax  She would be okay for a while, try to taper down miralax, but then she would have hard stools again, with pain and bleeding    She did pelvic floor PT for dyspareunia    She feels like things are in a different location since laceration repair    She feels pain in the perineum toward the vagina    She takes 1 cap miralax qam and 4 tabs colace at night  She has 1 daily BM  Stool consistency soft, not loose    She is not taking a fiber supplement.  She took in the past, but made her feel constipated    She tried otc hem cream, which helped a little  Sitz baths helpful    She is currently breastfeeding    She did use estrogen cream for vagina    +hx abnl Pap    Past Medical History:   Diagnosis Date   • Abnormal Pap smear of cervix    • Cervical dysplasia    • Constipation    • GBS carrier    • HPV (human papilloma virus) infection    • Oligohydramnios antepartum, third trimester, fetus 1        Past Surgical History:   Procedure Laterality Date   •  SECTION N/A 2012    D/T BREECH POSITION, DR. ALICIA LINDO AT [facility]   • INTRAUTERINE DEVICE INSERTION N/A 2019    [employer], Lot # [id number], Expiration date 2025, DR. TAIWO LAINEZ   • INTRAUTERINE DEVICE INSERTION N/A     REMOVED 2017 BY DR. TAIWO LAINEZ   • VAGINAL BIRTH AFTER  SECTION N/A 2018    DR. TAIWO LAINEZ AT [facility]       Social History:   reports that she has never smoked. She has never used smokeless tobacco. She reports that she drinks alcohol. She reports that she does not use drugs.      Marriage status:     Family History    Problem Relation Age of Onset   • Cerebral palsy Son    • Hypertension Paternal Grandmother    • Breast cancer Neg Hx    • Uterine cancer Neg Hx    • Ovarian cancer Neg Hx    • Colon cancer Neg Hx    • Deep vein thrombosis Neg Hx    • Pulmonary embolism Neg Hx    • Osteoporosis Neg Hx          Current Outpatient Medications:   •  Bisacodyl (DULCOLAX PO), Take  by mouth., Disp: , Rfl:   •  Multiple Vitamins-Minerals (MULTIVITAMIN ADULT PO), Take  by mouth., Disp: , Rfl:   •  Polyethylene Glycol 3350 (MIRALAX PO), Take  by mouth., Disp: , Rfl:     Allergy  Patient has no known allergies.    Review of Systems   Constitution: Negative for decreased appetite, weakness and weight gain.   HENT: Negative for congestion, hearing loss and hoarse voice.    Eyes: Negative for blurred vision, discharge and visual disturbance.   Cardiovascular: Negative for chest pain, cyanosis and leg swelling.   Respiratory: Negative for cough, shortness of breath, sleep disturbances due to breathing and snoring.    Endocrine: Negative for cold intolerance and heat intolerance.   Hematologic/Lymphatic: Does not bruise/bleed easily.   Skin: Negative for itching, poor wound healing and skin cancer.   Musculoskeletal: Negative for arthritis, back pain, joint pain and joint swelling.   Gastrointestinal: Positive for change in bowel habit, bowel incontinence and constipation. Negative for abdominal pain.   Genitourinary: Positive for bladder incontinence. Negative for dysuria and hematuria.   Neurological: Negative for brief paralysis, excessive daytime sleepiness, dizziness, focal weakness, headaches and light-headedness.   Psychiatric/Behavioral: Negative for altered mental status and hallucinations. The patient does not have insomnia.    Allergic/Immunologic: Negative for HIV exposure and persistent infections.   All other systems reviewed and are negative.      Vitals:    04/12/19 1421   BP: 124/78   Pulse: 96   Temp: 98 °F (36.7 °C)   SpO2:  98%     Body mass index is 34.46 kg/m².    Physical Exam   Constitutional: She is oriented to person, place, and time. She appears well-developed and well-nourished. No distress.   HENT:   Head: Normocephalic and atraumatic.   Nose: Nose normal.   Mouth/Throat: Oropharynx is clear and moist.   Eyes: Conjunctivae and EOM are normal. Pupils are equal, round, and reactive to light.   Neck: Normal range of motion. No tracheal deviation present.   Pulmonary/Chest: Effort normal and breath sounds normal. No respiratory distress.   Abdominal: Soft. Bowel sounds are normal. She exhibits no distension.   Genitourinary:   Genitourinary Comments: Perianal exam: external: chronic-appearing anterior fissure.  Just distal to fissure there is a divot of scar tissue; does not appear to connect to fissure to form fistula   Musculoskeletal: Normal range of motion. She exhibits no edema or deformity.   Neurological: She is alert and oriented to person, place, and time. No cranial nerve deficit. Coordination and gait normal.   Skin: Skin is warm and dry.   Psychiatric: She has a normal mood and affect. Her behavior is normal. Judgment normal.       Review of Medical Record:  I reviewed Dr. Laird records: pt with anal pain    Assessment:  1. Anal fissure    2. Perineal laceration, initial encounter        Plan:    Extensive discussion with patient and her  regarding fissure.    Will begin with conservative medical management: diltiazem and lidocaine cream to be placed on the anus 3 times a day.  I recommended that she continue miralax and colace, and begin fiber daily.  I gave out written instructions.     Can consider fissurectomy with reapproximation of the tissue since this appears to be directly related to epiziotomy if no improvement.    Call or come in if symptoms worsen or do not improve    RTC 5-6 weeks      Scribed for Andres Avilez MD by Chika Larsen PA-C 4/12/2019  This patient was evaluated by me,  recommendations made, documentation reviewed, edited, and revised by me, Andres Avilez MD

## 2019-04-19 ENCOUNTER — OFFICE VISIT (OUTPATIENT)
Dept: OBSTETRICS AND GYNECOLOGY | Age: 34
End: 2019-04-19

## 2019-04-19 VITALS
HEIGHT: 67 IN | SYSTOLIC BLOOD PRESSURE: 112 MMHG | BODY MASS INDEX: 34.21 KG/M2 | WEIGHT: 218 LBS | DIASTOLIC BLOOD PRESSURE: 70 MMHG

## 2019-04-19 DIAGNOSIS — Z30.431 ENCOUNTER FOR ROUTINE CHECKING OF INTRAUTERINE CONTRACEPTIVE DEVICE (IUD): Primary | ICD-10-CM

## 2019-04-19 DIAGNOSIS — K62.89 ANAL OR RECTAL PAIN: ICD-10-CM

## 2019-04-19 PROCEDURE — 99213 OFFICE O/P EST LOW 20 MIN: CPT | Performed by: OBSTETRICS & GYNECOLOGY

## 2019-04-19 NOTE — PROGRESS NOTES
"Subjective   Matilda Cruz is a 33 y.o. female iud string check / paragard inserted 19 / no issues / last pap 19 neg.  Saw Dr. Avilez last week for rectal pain - dx with fissure -trying Ditalizem cream for now - hoping to avoid surgery - discussed  in the future.      History of Present Illness    The following portions of the patient's history were reviewed and updated as appropriate: allergies, current medications, past family history, past medical history, past social history, past surgical history and problem list.    Review of Systems   Constitutional: Negative for chills, fatigue and fever.   Gastrointestinal: Positive for rectal pain. Negative for abdominal distention and abdominal pain.   Genitourinary: Negative for dyspareunia, dysuria, menstrual problem, pelvic pain, vaginal bleeding, vaginal discharge and vaginal pain.   All other systems reviewed and are negative.    /70   Ht 170.2 cm (67\")   Wt 98.9 kg (218 lb)   Breastfeeding? Yes   BMI 34.14 kg/m²     Objective   Physical Exam   Constitutional: She is oriented to person, place, and time. She appears well-developed and well-nourished.   Genitourinary: Vagina normal.   Genitourinary Comments: IUD strings visualized at os   Neurological: She is alert and oriented to person, place, and time.   Skin: Skin is warm and dry.   Psychiatric: She has a normal mood and affect. Her behavior is normal.   Nursing note and vitals reviewed.      Assessment/Plan   Matilda was seen today for gynecologic exam.    Diagnoses and all orders for this visit:    Encounter for routine checking of intrauterine contraceptive device (IUD)    Anal or rectal pain       Counseling was given to patient for the following topics: instructions for management, risk factor reductions and prognosis . Total time of the encounter was 20 minutes and 15 minutes was spend counseling.           "

## 2019-06-07 ENCOUNTER — OFFICE VISIT (OUTPATIENT)
Dept: SURGERY | Facility: CLINIC | Age: 34
End: 2019-06-07

## 2019-06-07 VITALS
TEMPERATURE: 98.4 F | BODY MASS INDEX: 34.97 KG/M2 | WEIGHT: 222.8 LBS | HEIGHT: 67 IN | DIASTOLIC BLOOD PRESSURE: 80 MMHG | OXYGEN SATURATION: 98 % | HEART RATE: 63 BPM | SYSTOLIC BLOOD PRESSURE: 104 MMHG

## 2019-06-07 DIAGNOSIS — IMO0001: ICD-10-CM

## 2019-06-07 DIAGNOSIS — K60.2 ANAL FISSURE: Primary | ICD-10-CM

## 2019-06-07 PROCEDURE — 99212 OFFICE O/P EST SF 10 MIN: CPT | Performed by: COLON & RECTAL SURGERY

## 2019-06-07 NOTE — PROGRESS NOTES
"Matilda Cruz is a 33 y.o. female in for follow up of Anal fissure    Perineal laceration, subsequent encounter    Better  Refilled cream  Had AGE and diarrhea  Pain much better  Doing a lot of sitz bath      /80 (BP Location: Left arm, Patient Position: Sitting, Cuff Size: Adult)   Pulse 63   Temp 98.4 °F (36.9 °C) (Oral)   Ht 170.2 cm (67\")   Wt 101 kg (222 lb 12.8 oz)   SpO2 98%   BMI 34.90 kg/m²   Body mass index is 34.9 kg/m².      PE:  Physical Exam   Constitutional: She appears well-developed. No distress.   HENT:   Head: Normocephalic and atraumatic.   Abdominal: Soft. She exhibits no distension.   Genitourinary:   Genitourinary Comments: Perianal exam: anterior fissure     Musculoskeletal: Normal range of motion.   Neurological: She is alert.   Psychiatric: Thought content normal.         Assessment:   1. Anal fissure    2. Perineal laceration, subsequent encounter         Plan:  Healing. Continue dilt cream until 2-3 wks after having no symtoms        "

## 2020-02-28 ENCOUNTER — TELEPHONE (OUTPATIENT)
Dept: OBSTETRICS AND GYNECOLOGY | Age: 35
End: 2020-02-28

## 2020-02-28 ENCOUNTER — OFFICE VISIT (OUTPATIENT)
Dept: OBSTETRICS AND GYNECOLOGY | Age: 35
End: 2020-02-28

## 2020-02-28 VITALS
HEIGHT: 67 IN | BODY MASS INDEX: 35.94 KG/M2 | WEIGHT: 229 LBS | DIASTOLIC BLOOD PRESSURE: 74 MMHG | SYSTOLIC BLOOD PRESSURE: 122 MMHG

## 2020-02-28 DIAGNOSIS — Z30.431 ENCOUNTER FOR ROUTINE CHECKING OF INTRAUTERINE CONTRACEPTIVE DEVICE (IUD): ICD-10-CM

## 2020-02-28 DIAGNOSIS — Z13.89 SCREENING FOR BLOOD OR PROTEIN IN URINE: ICD-10-CM

## 2020-02-28 DIAGNOSIS — Z12.4 SCREENING FOR MALIGNANT NEOPLASM OF THE CERVIX: ICD-10-CM

## 2020-02-28 DIAGNOSIS — Z01.419 ENCOUNTER FOR GYNECOLOGICAL EXAMINATION WITHOUT ABNORMAL FINDING: Primary | ICD-10-CM

## 2020-02-28 DIAGNOSIS — N92.6 MISSED MENSES: ICD-10-CM

## 2020-02-28 PROBLEM — K62.89 ANAL OR RECTAL PAIN: Status: RESOLVED | Noted: 2019-03-20 | Resolved: 2020-02-28

## 2020-02-28 LAB
BILIRUB BLD-MCNC: NEGATIVE MG/DL
GLUCOSE UR STRIP-MCNC: NEGATIVE MG/DL
KETONES UR QL: NEGATIVE
LEUKOCYTE EST, POC: ABNORMAL
NITRITE UR-MCNC: NEGATIVE MG/ML
PH UR: 6 [PH] (ref 5–8)
PROT UR STRIP-MCNC: NEGATIVE MG/DL
RBC # UR STRIP: ABNORMAL /UL
SP GR UR: 1.02 (ref 1–1.03)
UROBILINOGEN UR QL: NORMAL

## 2020-02-28 PROCEDURE — 81002 URINALYSIS NONAUTO W/O SCOPE: CPT | Performed by: OBSTETRICS & GYNECOLOGY

## 2020-02-28 PROCEDURE — 99395 PREV VISIT EST AGE 18-39: CPT | Performed by: OBSTETRICS & GYNECOLOGY

## 2020-02-28 RX ORDER — NITROFURANTOIN 25; 75 MG/1; MG/1
100 CAPSULE ORAL 2 TIMES DAILY
Qty: 10 CAPSULE | Refills: 0 | Status: SHIPPED | OUTPATIENT
Start: 2020-02-28 | End: 2020-03-04

## 2020-02-28 NOTE — TELEPHONE ENCOUNTER
(Laird pt) Pt just left office and forgot to ask. Pt gets chronic uti's and will be traveling a lot coming up, pt is requesting a script with refills for cipro. Pharmacy is verified.     [phone]

## 2020-02-28 NOTE — PROGRESS NOTES
"Subjective   Matilda Cruz is a 34 y.o. female annual visit / last pap 02/13/19 , doing well on paragard - Rectal pain is much improved after [facility] and seeing Dr. Avilez for fissure. Periods are somewhat heavier with the paragard and a little more painful but she states tolerable.   History of Present Illness    The following portions of the patient's history were reviewed and updated as appropriate: allergies, current medications, past family history, past medical history, past social history, past surgical history and problem list.    Review of Systems   Constitutional: Negative for chills, fatigue and fever.   Gastrointestinal: Negative for abdominal distention and abdominal pain.   Genitourinary: Negative for dyspareunia, dysuria, menstrual problem, pelvic pain, vaginal bleeding, vaginal discharge and vaginal pain.   All other systems reviewed and are negative.    /74   Ht 170.2 cm (67\")   Wt 104 kg (229 lb)   LMP 02/04/2020 (Approximate)   Breastfeeding No   BMI 35.87 kg/m²     Objective   Physical Exam   Constitutional: She is oriented to person, place, and time. She appears well-developed and well-nourished.   Neck: Normal range of motion. Neck supple. No thyromegaly present.   Cardiovascular: Normal rate and regular rhythm.   Pulmonary/Chest: Effort normal and breath sounds normal. Right breast exhibits no mass, no nipple discharge, no skin change and no tenderness. Left breast exhibits no mass, no nipple discharge, no skin change and no tenderness.   Abdominal: Soft. Bowel sounds are normal. She exhibits no distension. There is no tenderness.   Genitourinary: Vagina normal and uterus normal. Pelvic exam was performed with patient supine. Uterus is not tender. Cervix exhibits no friability. Right adnexum displays no mass and no tenderness. Left adnexum displays no mass and no tenderness. No vaginal discharge found.   Genitourinary Comments: IUD strings visualized at os    Musculoskeletal: " Normal range of motion. She exhibits no edema.   Neurological: She is alert and oriented to person, place, and time.   Skin: Skin is warm and dry. No rash noted.   Psychiatric: She has a normal mood and affect. Her behavior is normal.   Nursing note and vitals reviewed.        Assessment/Plan   Matilda was seen today for gynecologic exam.    Diagnoses and all orders for this visit:    Encounter for gynecological examination without abnormal finding    Screening for malignant neoplasm of the cervix  -     IgP, Aptima HPV    Screening for blood or protein in urine  -     POC Urinalysis Dipstick    Encounter for routine checking of intrauterine contraceptive device (IUD)        Counseling was given to patient for the following topics: instructions for management, impressions, importance of treatment compliance and self-breast exams .

## 2020-03-03 ENCOUNTER — TELEPHONE (OUTPATIENT)
Dept: OBSTETRICS AND GYNECOLOGY | Age: 35
End: 2020-03-03

## 2020-03-03 LAB
CYTOLOGIST CVX/VAG CYTO: NORMAL
CYTOLOGY CVX/VAG DOC CYTO: NORMAL
CYTOLOGY CVX/VAG DOC THIN PREP: NORMAL
DX ICD CODE: NORMAL
HIV 1 & 2 AB SER-IMP: NORMAL
HPV I/H RISK 4 DNA CVX QL PROBE+SIG AMP: NEGATIVE
OTHER STN SPEC: NORMAL
STAT OF ADQ CVX/VAG CYTO-IMP: NORMAL

## 2020-03-03 NOTE — TELEPHONE ENCOUNTER
----- Message from Lorie Laird MD sent at 3/3/2020  8:41 AM EST -----  Please call patient and notify of normal results of pap smear

## 2020-10-12 ENCOUNTER — TELEPHONE (OUTPATIENT)
Dept: OBSTETRICS AND GYNECOLOGY | Age: 35
End: 2020-10-12

## 2020-10-12 DIAGNOSIS — N92.6 MISSED MENSES: Primary | ICD-10-CM

## 2020-10-12 NOTE — TELEPHONE ENCOUNTER
([facility] pt)  Pt called, has paragard IUD and is 14 days late on her period.   Should she be alarmed or is this normal?    [phone]

## 2020-10-15 LAB — HCG INTACT+B SERPL-ACNC: <0.5 MIU/ML

## 2020-12-02 NOTE — PROGRESS NOTES
Chief Complaint   Patient presents with   • Routine Prenatal Visit     WITH ANATOMY SCAN       HPI: 32 y.o.  at 19w2d presents for prenatal care - no complaints -      Vitals:    18 1407   BP: 112/72   Weight: 101 kg (223 lb)       ROS:  GI:  Negative  : neg  Pulmonary: Negative     A/P  1. Intrauterine pregnancy at 19w2d   2. Pregnancy Risk:  HIGH RISK    Matilda was seen today for routine prenatal visit.    Diagnoses and all orders for this visit:    Prenatal care, subsequent pregnancy in second trimester  -     POC Urinalysis Dipstick, Automated    19 weeks gestation of pregnancy  -     POC Urinalysis Dipstick, Automated    Desires  (vaginal birth after ) trial    Obesity affecting pregnancy in second trimester    Previous  delivery affecting pregnancy    Evaluate anatomy not seen on prior sonogram        -----------------------  PLAN:   Return in about 4 weeks (around 2018), or ob check and repeat mike US .  Normal but incomplete mike - repeat 2 weeks \  Desires TOLAC   Obesity - only 6 lb weight gain   SAB warnings     Lorie Laird MD  2018 2:39 PM     supple/normal

## 2021-03-25 ENCOUNTER — TELEPHONE (OUTPATIENT)
Dept: OBSTETRICS AND GYNECOLOGY | Age: 36
End: 2021-03-25

## 2021-03-25 NOTE — TELEPHONE ENCOUNTER
Pt called needing to reschedule her AE. Your next available is January 2022, please advise where to reschedule

## 2021-04-16 ENCOUNTER — BULK ORDERING (OUTPATIENT)
Dept: CASE MANAGEMENT | Facility: OTHER | Age: 36
End: 2021-04-16

## 2021-04-16 ENCOUNTER — OFFICE VISIT (OUTPATIENT)
Dept: OBSTETRICS AND GYNECOLOGY | Age: 36
End: 2021-04-16

## 2021-04-16 VITALS
BODY MASS INDEX: 37.77 KG/M2 | DIASTOLIC BLOOD PRESSURE: 70 MMHG | SYSTOLIC BLOOD PRESSURE: 110 MMHG | WEIGHT: 235 LBS | HEIGHT: 66 IN

## 2021-04-16 DIAGNOSIS — Z12.4 SCREENING FOR MALIGNANT NEOPLASM OF THE CERVIX: ICD-10-CM

## 2021-04-16 DIAGNOSIS — Z01.419 ENCOUNTER FOR GYNECOLOGICAL EXAMINATION WITHOUT ABNORMAL FINDING: ICD-10-CM

## 2021-04-16 DIAGNOSIS — Z97.5 IUD (INTRAUTERINE DEVICE) IN PLACE: ICD-10-CM

## 2021-04-16 DIAGNOSIS — Z23 IMMUNIZATION DUE: ICD-10-CM

## 2021-04-16 DIAGNOSIS — Z13.89 SCREENING FOR BLOOD OR PROTEIN IN URINE: ICD-10-CM

## 2021-04-16 DIAGNOSIS — Z30.431 ENCOUNTER FOR ROUTINE CHECKING OF INTRAUTERINE CONTRACEPTIVE DEVICE (IUD): Primary | ICD-10-CM

## 2021-04-16 LAB
BILIRUB BLD-MCNC: NEGATIVE MG/DL
GLUCOSE UR STRIP-MCNC: NEGATIVE MG/DL
KETONES UR QL: ABNORMAL
LEUKOCYTE EST, POC: ABNORMAL
NITRITE UR-MCNC: NEGATIVE MG/ML
PH UR: 6 [PH] (ref 5–8)
PROT UR STRIP-MCNC: NEGATIVE MG/DL
RBC # UR STRIP: ABNORMAL /UL
SP GR UR: 1.02 (ref 1–1.03)
UROBILINOGEN UR QL: NORMAL

## 2021-04-16 PROCEDURE — 81002 URINALYSIS NONAUTO W/O SCOPE: CPT | Performed by: OBSTETRICS & GYNECOLOGY

## 2021-04-16 PROCEDURE — 99395 PREV VISIT EST AGE 18-39: CPT | Performed by: OBSTETRICS & GYNECOLOGY

## 2021-04-16 NOTE — PROGRESS NOTES
"Subjective   Matilda Cruz is a 35 y.o. female annual visit today , last pap 02/28/20 neg , paragard for contraception , periods are heavy but managable , pt is moving to [place] in process of listing the house in [place].  Moving to be closer to family. Boys are doing well. Denies complaints.       History of Present Illness    The following portions of the patient's history were reviewed and updated as appropriate: allergies, current medications, past family history, past medical history, past social history, past surgical history and problem list.    Review of Systems   Constitutional: Negative for chills, fatigue and fever.   Gastrointestinal: Negative for abdominal distention and abdominal pain.   Genitourinary: Negative for dyspareunia, dysuria, menstrual problem, pelvic pain, vaginal bleeding, vaginal discharge and vaginal pain.   All other systems reviewed and are negative.    /70   Ht 167.6 cm (66\")   Wt 107 kg (235 lb)   LMP 04/11/2021 (Exact Date)   Breastfeeding No   BMI 37.93 kg/m²     Objective   Physical Exam  Vitals and nursing note reviewed.   Constitutional:       Appearance: Normal appearance. She is well-developed. She is obese.   Neck:      Thyroid: No thyromegaly.   Pulmonary:      Effort: Pulmonary effort is normal.   Chest:      Breasts:         Right: No mass, nipple discharge, skin change or tenderness.         Left: No mass, nipple discharge, skin change or tenderness.   Abdominal:      General: Bowel sounds are normal. There is no distension.      Palpations: Abdomen is soft.      Tenderness: There is no abdominal tenderness.   Genitourinary:     General: Normal vulva.      Exam position: Supine.      Labia:         Right: No rash or lesion.         Left: No rash or lesion.       Vagina: Normal. No vaginal discharge.      Cervix: No friability, lesion or cervical bleeding.      Uterus: Not enlarged and not tender.       Adnexa:         Right: No mass or tenderness.        "   Left: No mass or tenderness.        Comments: IUD strings visualized at os   Musculoskeletal:         General: Normal range of motion.      Cervical back: Normal range of motion.   Skin:     General: Skin is warm and dry.      Findings: No rash.   Neurological:      Mental Status: She is alert and oriented to person, place, and time.   Psychiatric:         Behavior: Behavior normal.           Assessment/Plan   Diagnoses and all orders for this visit:    1. Encounter for routine checking of intrauterine contraceptive device (IUD) (Primary)    2. Screening for malignant neoplasm of the cervix  -     POC Urinalysis Dipstick    3. Screening for blood or protein in urine  -     IgP, Aptima HPV    4. IUD (intrauterine device) in place    5. Encounter for gynecological examination without abnormal finding      Counseling was given to patient for the following topics: instructions for management, risk factor reductions, impressions and self-breast exams  .   No follow-ups on file.

## 2023-04-03 NOTE — NURSING NOTE
9/19-9/20 7p-7a RN at bedside for almost entirety of patient's labor, closely monitoring contraction duration and length via EFM, palpation, patient report, and observation. Reflected in RN charting but may not be reflective on EFM tracing.  
OK per Dr Cruz to repeat cbc in am vs 6 hours post transfusion.  Second unit of blood completed at 2100. Informed lab of the change.  
Patient in shower from 1215 to 1312. Difficulty tracing FHR and ctx. RN remains at bedside throughout.     
Pt in shower from 1530 to 1601. Unable to trace due to maternal movement and pain. RN at bedside throughout. Per palpation and pt report, ctx every 1-3 min lasting 30-60 seconds.   
RN at bedside from 1408 to 1430 hand holding fetal heart rate monitor. Difficult to trace due to maternal position, obesity, and movement. Audible maternal heart rate auscultated in the 70s but unable to confirm presence or absence of decelerations. Palpated ctx every 1-3 minutes, moderate intensity and greater than 60s rest between. Encouraged patient to empty bladder. Will continue to monitor and if unable to determine fetal heart rate tracing, will consider fetal scalp electrode.   
No